# Patient Record
Sex: MALE | Race: BLACK OR AFRICAN AMERICAN | NOT HISPANIC OR LATINO | URBAN - METROPOLITAN AREA
[De-identification: names, ages, dates, MRNs, and addresses within clinical notes are randomized per-mention and may not be internally consistent; named-entity substitution may affect disease eponyms.]

---

## 2017-08-18 ENCOUNTER — EMERGENCY (EMERGENCY)
Facility: HOSPITAL | Age: 47
LOS: 0 days | Discharge: HOME | End: 2017-08-18
Attending: EMERGENCY MEDICINE | Admitting: EMERGENCY MEDICINE
Payer: COMMERCIAL

## 2017-08-18 VITALS — WEIGHT: 169.98 LBS | HEIGHT: 72 IN

## 2017-08-18 VITALS
RESPIRATION RATE: 18 BRPM | OXYGEN SATURATION: 97 % | DIASTOLIC BLOOD PRESSURE: 82 MMHG | TEMPERATURE: 98 F | SYSTOLIC BLOOD PRESSURE: 125 MMHG | HEART RATE: 69 BPM

## 2017-08-18 DIAGNOSIS — M54.9 DORSALGIA, UNSPECIFIED: ICD-10-CM

## 2017-08-18 DIAGNOSIS — S39.012A STRAIN OF MUSCLE, FASCIA AND TENDON OF LOWER BACK, INITIAL ENCOUNTER: ICD-10-CM
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
## 2017-08-18 DIAGNOSIS — Z91.010 ALLERGY TO PEANUTS: ICD-10-CM
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
## 2017-08-18 PROCEDURE — 99283 EMERGENCY DEPT VISIT LOW MDM: CPT

## 2017-08-18 PROCEDURE — 72070 X-RAY EXAM THORAC SPINE 2VWS: CPT | Mod: 26

## 2017-08-18 PROCEDURE — 72100 X-RAY EXAM L-S SPINE 2/3 VWS: CPT | Mod: 26

## 2017-08-18 RX ORDER — IBUPROFEN 200 MG
800 TABLET ORAL ONCE
Qty: 0 | Refills: 0 | Status: COMPLETED | OUTPATIENT
Start: 2017-08-18 | End: 2017-08-18

## 2017-08-18 RX ADMIN — Medication 800 MILLIGRAM(S): at 14:35

## 2017-08-18 NOTE — ED STATDOCS - MEDICAL DECISION MAKING DETAILS
XR of thoracic and lumbar spine WNL.  No emergent need for MRI from emergency department, can f/u as scheduled for this.  Recommend rest, and NSAIDs.  Return precautions given.
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
## 2017-08-18 NOTE — ED STATDOCS - PROGRESS NOTE DETAILS
STEPHEN Correa:   Patient has been seen, evaluated and orders have been written by the attending in intake. Patient is stable.  I will follow up the results of orders written and I will continue to evaluate/observe the patient.  Rosamaria Correa PA-C Pt. provided copies of readings of xrays.  Rosamaria Correa PA-C

## 2019-02-11 ENCOUNTER — TRANSCRIPTION ENCOUNTER (OUTPATIENT)
Age: 49
End: 2019-02-11

## 2022-02-15 ENCOUNTER — INPATIENT (INPATIENT)
Facility: HOSPITAL | Age: 52
LOS: 1 days | Discharge: HOME | DRG: 287 | End: 2022-02-17
Attending: INTERNAL MEDICINE | Admitting: INTERNAL MEDICINE
Payer: MEDICAID

## 2022-02-15 VITALS
DIASTOLIC BLOOD PRESSURE: 96 MMHG | TEMPERATURE: 97 F | RESPIRATION RATE: 18 BRPM | HEIGHT: 66 IN | OXYGEN SATURATION: 98 % | SYSTOLIC BLOOD PRESSURE: 159 MMHG | WEIGHT: 179.9 LBS | HEART RATE: 62 BPM

## 2022-02-15 DIAGNOSIS — R07.9 CHEST PAIN, UNSPECIFIED: ICD-10-CM

## 2022-02-15 LAB
ALBUMIN SERPL ELPH-MCNC: 4.8 G/DL — SIGNIFICANT CHANGE UP (ref 3.3–5)
ALP SERPL-CCNC: 64 U/L — SIGNIFICANT CHANGE UP (ref 40–120)
ALT FLD-CCNC: 19 U/L — SIGNIFICANT CHANGE UP (ref 10–45)
ANION GAP SERPL CALC-SCNC: 13 MMOL/L — SIGNIFICANT CHANGE UP (ref 5–17)
AST SERPL-CCNC: 20 U/L — SIGNIFICANT CHANGE UP (ref 10–40)
BASOPHILS # BLD AUTO: 0.03 K/UL — SIGNIFICANT CHANGE UP (ref 0–0.2)
BASOPHILS NFR BLD AUTO: 0.6 % — SIGNIFICANT CHANGE UP (ref 0–2)
BILIRUB SERPL-MCNC: 0.3 MG/DL — SIGNIFICANT CHANGE UP (ref 0.2–1.2)
BUN SERPL-MCNC: 22 MG/DL — SIGNIFICANT CHANGE UP (ref 7–23)
CALCIUM SERPL-MCNC: 9.6 MG/DL — SIGNIFICANT CHANGE UP (ref 8.4–10.5)
CHLORIDE SERPL-SCNC: 101 MMOL/L — SIGNIFICANT CHANGE UP (ref 96–108)
CO2 SERPL-SCNC: 24 MMOL/L — SIGNIFICANT CHANGE UP (ref 22–31)
CREAT SERPL-MCNC: 1.47 MG/DL — HIGH (ref 0.5–1.3)
EOSINOPHIL # BLD AUTO: 0.46 K/UL — SIGNIFICANT CHANGE UP (ref 0–0.5)
EOSINOPHIL NFR BLD AUTO: 9.5 % — HIGH (ref 0–6)
GLUCOSE SERPL-MCNC: 99 MG/DL — SIGNIFICANT CHANGE UP (ref 70–99)
HCT VFR BLD CALC: 44.5 % — SIGNIFICANT CHANGE UP (ref 39–50)
HGB BLD-MCNC: 14.3 G/DL — SIGNIFICANT CHANGE UP (ref 13–17)
IMM GRANULOCYTES NFR BLD AUTO: 0.2 % — SIGNIFICANT CHANGE UP (ref 0–1.5)
LYMPHOCYTES # BLD AUTO: 1.7 K/UL — SIGNIFICANT CHANGE UP (ref 1–3.3)
LYMPHOCYTES # BLD AUTO: 35.1 % — SIGNIFICANT CHANGE UP (ref 13–44)
MCHC RBC-ENTMCNC: 24.4 PG — LOW (ref 27–34)
MCHC RBC-ENTMCNC: 32.1 GM/DL — SIGNIFICANT CHANGE UP (ref 32–36)
MCV RBC AUTO: 75.8 FL — LOW (ref 80–100)
MONOCYTES # BLD AUTO: 0.28 K/UL — SIGNIFICANT CHANGE UP (ref 0–0.9)
MONOCYTES NFR BLD AUTO: 5.8 % — SIGNIFICANT CHANGE UP (ref 2–14)
NEUTROPHILS # BLD AUTO: 2.37 K/UL — SIGNIFICANT CHANGE UP (ref 1.8–7.4)
NEUTROPHILS NFR BLD AUTO: 48.8 % — SIGNIFICANT CHANGE UP (ref 43–77)
NRBC # BLD: 0 /100 WBCS — SIGNIFICANT CHANGE UP (ref 0–0)
PLATELET # BLD AUTO: 204 K/UL — SIGNIFICANT CHANGE UP (ref 150–400)
POTASSIUM SERPL-MCNC: 3.6 MMOL/L — SIGNIFICANT CHANGE UP (ref 3.5–5.3)
POTASSIUM SERPL-SCNC: 3.6 MMOL/L — SIGNIFICANT CHANGE UP (ref 3.5–5.3)
PROT SERPL-MCNC: 7.4 G/DL — SIGNIFICANT CHANGE UP (ref 6–8.3)
RBC # BLD: 5.87 M/UL — HIGH (ref 4.2–5.8)
RBC # FLD: 14.8 % — HIGH (ref 10.3–14.5)
SARS-COV-2 RNA SPEC QL NAA+PROBE: SIGNIFICANT CHANGE UP
SODIUM SERPL-SCNC: 138 MMOL/L — SIGNIFICANT CHANGE UP (ref 135–145)
TROPONIN T, HIGH SENSITIVITY RESULT: 8 NG/L — SIGNIFICANT CHANGE UP (ref 0–51)
TROPONIN T, HIGH SENSITIVITY RESULT: 9 NG/L — SIGNIFICANT CHANGE UP (ref 0–51)
WBC # BLD: 4.85 K/UL — SIGNIFICANT CHANGE UP (ref 3.8–10.5)
WBC # FLD AUTO: 4.85 K/UL — SIGNIFICANT CHANGE UP (ref 3.8–10.5)

## 2022-02-15 PROCEDURE — 70551 MRI BRAIN STEM W/O DYE: CPT | Mod: 26

## 2022-02-15 PROCEDURE — 74174 CTA ABD&PLVS W/CONTRAST: CPT | Mod: 26,MA

## 2022-02-15 PROCEDURE — 71275 CT ANGIOGRAPHY CHEST: CPT | Mod: 26,MA

## 2022-02-15 PROCEDURE — 71046 X-RAY EXAM CHEST 2 VIEWS: CPT | Mod: 26

## 2022-02-15 PROCEDURE — 99285 EMERGENCY DEPT VISIT HI MDM: CPT

## 2022-02-15 PROCEDURE — 93306 TTE W/DOPPLER COMPLETE: CPT | Mod: 26

## 2022-02-15 PROCEDURE — 70450 CT HEAD/BRAIN W/O DYE: CPT | Mod: 26,MA

## 2022-02-15 RX ORDER — ATORVASTATIN CALCIUM 80 MG/1
20 TABLET, FILM COATED ORAL AT BEDTIME
Refills: 0 | Status: DISCONTINUED | OUTPATIENT
Start: 2022-02-15 | End: 2022-02-15

## 2022-02-15 RX ORDER — LISINOPRIL 2.5 MG/1
20 TABLET ORAL DAILY
Refills: 0 | Status: DISCONTINUED | OUTPATIENT
Start: 2022-02-15 | End: 2022-02-17

## 2022-02-15 RX ORDER — ASPIRIN/CALCIUM CARB/MAGNESIUM 324 MG
81 TABLET ORAL DAILY
Refills: 0 | Status: DISCONTINUED | OUTPATIENT
Start: 2022-02-15 | End: 2022-02-17

## 2022-02-15 RX ORDER — SIMVASTATIN 20 MG/1
20 TABLET, FILM COATED ORAL AT BEDTIME
Refills: 0 | Status: DISCONTINUED | OUTPATIENT
Start: 2022-02-15 | End: 2022-02-17

## 2022-02-15 RX ORDER — ASPIRIN/CALCIUM CARB/MAGNESIUM 324 MG
162 TABLET ORAL ONCE
Refills: 0 | Status: COMPLETED | OUTPATIENT
Start: 2022-02-15 | End: 2022-02-15

## 2022-02-15 RX ADMIN — LISINOPRIL 20 MILLIGRAM(S): 2.5 TABLET ORAL at 14:02

## 2022-02-15 RX ADMIN — Medication 162 MILLIGRAM(S): at 08:54

## 2022-02-15 RX ADMIN — SIMVASTATIN 20 MILLIGRAM(S): 20 TABLET, FILM COATED ORAL at 22:07

## 2022-02-15 NOTE — ED PROVIDER NOTE - PHYSICAL EXAMINATION
gen: well appearing  Mentation: AAO x 3  psych: mood appropriate  ENT: airway patent  Eyes: conjunctivae clear bilaterally  Cardio: RRR, no m/r/g  Resp: normal BS b/l  GI: s/nt/nd  Neuro: sensation and motor function intact, CN 2-12 intact  MSK: normal movement of all extremities  Lymph/Vasc: no LE edema

## 2022-02-15 NOTE — H&P ADULT - NSHPPHYSICALEXAM_GEN_ALL_CORE
PHYSICAL EXAMINATION:  Vital Signs Last 24 Hrs  T(C): 36.7 (15 Feb 2022 06:39), Max: 36.7 (15 Feb 2022 06:39)  T(F): 98 (15 Feb 2022 06:39), Max: 98 (15 Feb 2022 06:39)  HR: 64 (15 Feb 2022 06:39) (62 - 64)  BP: 178/126 (15 Feb 2022 06:39) (159/96 - 178/126)  BP(mean): --  RR: 16 (15 Feb 2022 06:39) (16 - 18)  SpO2: 100% (15 Feb 2022 06:39) (98% - 100%)  CAPILLARY BLOOD GLUCOSE          GENERAL: NAD, well-groomed, well-developed  HEAD:  atraumatic, normocephalic  EYES: sclera anicteric  ENMT: mucous membranes moist  NECK: supple, No JVD  CHEST/LUNG: clear to auscultation bilaterally; no rales, rhonchi, or wheezing b/l  HEART: normal S1, S2  ABDOMEN: BS+, soft, ND, NT   EXTREMITIES:  pulses palpable; no clubbing, cyanosis, or edema b/l LEs  NEURO: awake, alert, interactive; moves all extremities  SKIN: no rashes or lesions PHYSICAL EXAMINATION:  Vital Signs Last 24 Hrs  T(C): 36.7 (15 Feb 2022 06:39), Max: 36.7 (15 Feb 2022 06:39)  T(F): 98 (15 Feb 2022 06:39), Max: 98 (15 Feb 2022 06:39)  HR: 64 (15 Feb 2022 06:39) (62 - 64)  BP: 178/126 (15 Feb 2022 06:39) (159/96 - 178/126)  BP(mean): --  RR: 16 (15 Feb 2022 06:39) (16 - 18)  SpO2: 100% (15 Feb 2022 06:39) (98% - 100%)  CAPILLARY BLOOD GLUCOSE          GENERAL: NAD, seen in ER, comfortable, no CP or SOB  HEAD:  atraumatic, normocephalic  EYES: sclera anicteric  ENMT: mucous membranes moist  NECK: supple, No JVD  CHEST/LUNG: clear to auscultation bilaterally; no rales, rhonchi, or wheezing b/l  HEART: normal S1, S2  ABDOMEN: BS+, soft, ND, NT   EXTREMITIES:  pulses palpable; no clubbing, cyanosis, or edema b/l LEs  NEURO: awake, alert, interactive; moves all extremities  SKIN: no rashes or lesions

## 2022-02-15 NOTE — H&P ADULT - ASSESSMENT
52 y/o male PMH of DM(II), presenting with chest pain eval. Patient states he has been having episodes of chest pain with associated syncope for 6 months, some SOB, nausea diaphoresis as well. States during syncope his body shuts down for few seconds at time. Episodes happen at random. Patient states he does not fall during these episodes. No urinary incontinence or noted shaking. Patient has not seen MD for this problem, dealing with this for months.  Does not have symptoms at this time, VSS in ER.     Plan: Admit to [facility]. TTE, stress test in AM.  Will continue home meds of ASA, Lisinopril and Zocur. Lipid panel and A1C in AM. No prior stress test. Labs and trop all negative.   No active infection.     CT head no acute findings. Brain MRI non-contrast as well.     If above normal discharge home in AM.

## 2022-02-15 NOTE — ED PROVIDER NOTE - PROGRESS NOTE DETAILS
Carlos, DO PGY-3: during evaluation patient started complaining of L arm weakness, inability to make a fist or lift arm. given CP now with ?neuro deficit (symptoms started after RN placed IV in arm) will obtain CT head as well as dissection study DO Carlos PGY-3: patient complaining of severe pain during contrast administration for CTA, IV flushing with ease, no evidence of extrav. patient without any symptoms concerning for anaphylaxis. pt states "my body is rejecting the liquid". discussed need for CTA to eval for dissection as patient hx with concerning symptoms. patient freely moving b/l upper and lower extremities, no focal deficit appreciated. pt agreed for contrast administration through a different site Mecca Chin MD (PGY2) -  Neurology consulted, will see patient shortly. Paged Dr. Norton for admission. Pt in agreement w plan. Currently patient states the LUE weakness has resolved. Rpt trop pending Mecca Chin MD (PGY2) -  Trop neg, CTA negative, pt endorsed to Dr. Norton at this time. TTE ordered.

## 2022-02-15 NOTE — ED ADULT NURSE NOTE - OBJECTIVE STATEMENT
51 y.o male c/o chest pain with associated syncope x1yr. Worsened tonight 51 y.o male c/o chest pain with associated syncope x1yr that worsened tonight. Pt states CP stopped after midnight. Endorsing intermittent swelling in B/L LE. Endorses diaphoresis and SOB associated with chest pain. Denies NVD, fever, cough, HA, change in vision. Pt speech clear. PMH pre diabetic and HTN. Pt takes Lipitor and Lisinopril

## 2022-02-15 NOTE — CONSULT NOTE ADULT - SUBJECTIVE AND OBJECTIVE BOX
CHIEF COMPLAINT:Patient is a 51y old  Male who presents with a chief complaint of Chest pain eval. (15 Feb 2022 10:40)      HPI:  52 y/o male PMH of DM(II), presenting with chest pain eval. Patient states he has been having episodes of chest pain with associated syncope for 6 months, some SOB, nausea diaphoresis as well. States during syncope his body shuts down for few seconds at time. Episodes happen at random. Patient states he does not fall during these episodes. No urinary incontinence or noted shaking. Patient has not seen MD for this problem, dealing with this for months.  Does not have symptoms at this time, VSS in ER.       PAST MEDICAL & SURGICAL HISTORY:      MEDICATIONS  (STANDING):  aspirin enteric coated 81 milliGRAM(s) Oral daily  lisinopril 20 milliGRAM(s) Oral daily  simvastatin 20 milliGRAM(s) Oral at bedtime    MEDICATIONS  (PRN):      FAMILY HISTORY:      SOCIAL HISTORY:    [ ] Non-smoker  [ ] Smoker  [ ] Alcohol    Allergies    No Known Allergies    Intolerances    	    REVIEW OF SYSTEMS:  CONSTITUTIONAL: No fever, weight loss, or fatigue  EYES: No eye pain, visual disturbances, or discharge  ENT:  No difficulty hearing, tinnitus, vertigo; No sinus or throat pain  NECK: No pain or stiffness  RESPIRATORY: No cough, wheezing, chills or hemoptysis; No Shortness of Breath  CARDIOVASCULAR: No chest pain, palpitations, passing out, dizziness, or leg swelling  GASTROINTESTINAL: No abdominal or epigastric pain. No nausea, vomiting, or hematemesis; No diarrhea or constipation. No melena or hematochezia.  GENITOURINARY: No dysuria, frequency, hematuria, or incontinence  NEUROLOGICAL: No headaches, memory loss, loss of strength, numbness, or tremors  SKIN: No itching, burning, rashes, or lesions   LYMPH Nodes: No enlarged glands  ENDOCRINE: No heat or cold intolerance; No hair loss  MUSCULOSKELETAL: No joint pain or swelling; No muscle, back, or extremity pain  PSYCHIATRIC: No depression, anxiety, mood swings, or difficulty sleeping  HEME/LYMPH: No easy bruising, or bleeding gums  ALLERGY AND IMMUNOLOGIC: No hives or eczema	    [ ] All others negative	  [ ] Unable to obtain    PHYSICAL EXAM:  T(C): 36.8 (02-15-22 @ 17:37), Max: 36.8 (02-15-22 @ 17:37)  HR: 56 (02-15-22 @ 17:37) (56 - 64)  BP: 135/87 (02-15-22 @ 17:37) (135/87 - 178/126)  RR: 18 (02-15-22 @ 17:37) (16 - 18)  SpO2: 99% (02-15-22 @ 12:17) (98% - 100%)  Wt(kg): --  I&O's Summary      Appearance: Normal	  HEENT:   Normal oral mucosa, PERRL, EOMI	  Lymphatic: No lymphadenopathy  Cardiovascular: Normal S1 S2, No JVD, + murmurs, No edema  Respiratory: Lungs clear to auscultation	  Psychiatry: A & O x 3, Mood & affect appropriate  Gastrointestinal:  Soft, Non-tender, + BS	  Skin: No rashes, No ecchymoses, No cyanosis	  Neurologic: Non-focal  Extremities: Normal range of motion, No clubbing, cyanosis or edema  Vascular: Peripheral pulses palpable 2+ bilaterally    TELEMETRY: 	    ECG:  	  RADIOLOGY:  OTHER: 	  	  LABS:	 	    CARDIAC MARKERS:                              14.3   4.85  )-----------( 204      ( 15 Feb 2022 06:59 )             44.5     02-15    138  |  101  |  22  ----------------------------<  99  3.6   |  24  |  1.47<H>    Ca    9.6      15 Feb 2022 06:59    TPro  7.4  /  Alb  4.8  /  TBili  0.3  /  DBili  x   /  AST  20  /  ALT  19  /  AlkPhos  64  02-15    proBNP:   Lipid Profile:   HgA1c:   TSH:       PREVIOUS DIAGNOSTIC TESTING:    < from: 12 Lead ECG (02.15.22 @ 07:00) >  Diagnosis Line *** POOR DATA QUALITY, INTERPRETATION MAY BE ADVERSELY AFFECTED  NORMAL SINUS RHYTHM  NONSPECIFIC T WAVE ABNORMALITY  NO PREVIOUS ECGS AVAILABLE    < from: Transthoracic Echocardiogram (02.15.22 @ 09:12) >  Mitral Valve: Normal mitral valve. Minimal mitral  regurgitation.  Aortic Valve/Aorta: Normal aortic valve.  Normal aortic root size.  Left Atrium: Normal left atrium.  Left Ventricle: Normal left ventricular internal dimensions  and wall thicknesses.  Normal left ventricular systolic function. No segmental  wall motion abnormalities.  Normal diastolic function.  Right Heart: Normal right atrium. Normal right ventricular  size and function.  Normal tricuspid valve. Mild tricuspid regurgitation.  Normal pulmonic valve. Minimal pulmonic regurgitation.  Pericardium/Pleura: Normal pericardium with no pericardial  effusion.  Hemodynamic: Estimated right atrial pressure is normal.  No evidence of pulmonary hypertension.  No PFO seen with color Doppler.  ------------------------------------------------------------------------  Conclusions:  Normal left ventricular systolic function. No segmental  wall motion abnormalities.  Normal right ventricular size and function.    < from: Xray Chest 2 Views PA/Lat (02.15.22 @ 07:43) >  INTERPRETATION: The heart is not enlarged.  The trachea is midline.  The mediastinum and tay appear unremarkable.  The lungs are clear.  No pleural effusion or pneumothorax is seen.  No acute bony abnormality is noted.    IMPRESSION:  Clear lungs.    Please see the report of the CTA of the chest for further detail.

## 2022-02-15 NOTE — CONSULT NOTE ADULT - ASSESSMENT
INCOMPLETE         Sleep Disordered Breathing Screen    SDB Screen - Positive/Negative  Witnessed Apneas: Yes/No  Snoring: Yes/No  Early AM headaches: Yes/No  Waking with a dry mouth/throat: Yes/No  Choking or gasping awakenings: Yes/No 50 yo RH male w/ PMx DM, HLD, HTN p/w chest pain. Neurology consulted for intermittent ~1 second episodes of +LOC w/o loss of motor control, w/o seizure-like activity, occurring up to zero to four times per day, since the last 1-2 years. Also c/o numbness/tingling throughout R neck, RUE, R torso, RLE ongoing for last 6-7 months, occurs while in bed, resolves w/ sitting upright in bed. Also c/o intermittent cramping-type symptoms of LE (RLE > LLE). Neurological examination: impaired short-term memory (retrieval failure); impaired attention/concentration; grossly 5/5 throughout; DTRs 2+; intact light touch, pinprick, and proprioception throughout. CTH: negative.     Impression: ***    Plan: ***  [] ***      Sleep Disordered Breathing Screen    SDB Screen - Positive/Negative  Witnessed Apneas: Yes/No  Snoring: Yes/No  Early AM headaches: Yes/No  Waking with a dry mouth/throat: Yes/No  Choking or gasping awakenings: Yes/No 52 yo RH male w/ PMx DM, HLD, HTN p/w chest pain. Neurology consulted for intermittent ~1 second episodes of +LOC w/o loss of motor control, w/o seizure-like activity, occurring up to zero to four times per day, since the last 1-2 years. Also c/o numbness/tingling throughout R neck, RUE, R torso, RLE ongoing for last 6-7 months, occurs while in bed, resolves w/ sitting upright in bed. Also c/o intermittent cramping-type symptoms of LE (RLE > LLE). Neurological examination: impaired short-term memory (retrieval failure); impaired attention/concentration; grossly 5/5 throughout; DTRs 2+; intact light touch, pinprick, and proprioception throughout. CTH: negative. Negative sleep disordered breathing screen.    Impression: ***    Plan: ***  [] vEEG  [] ***incomplete***     50 yo RH male w/ PMx DM, HLD, HTN p/w chest pain. Neurology consulted for intermittent ~1 second episodes of +LOC w/o loss of motor control, w/o seizure-like activity, occurring up to zero to four times per day, since the last 1-2 years; also reports feeling fatigue most days. Also c/o numbness/tingling throughout R neck, RUE, R torso, RLE ongoing for last 6-7 months, occurs while in bed, resolves w/ sitting upright in bed. Also c/o intermittent cramping-type symptoms of LE (RLE > LLE). Neurological examination: impaired short-term memory (retrieval failure); impaired attention/concentration; grossly 5/5 throughout; DTRs 2+; intact light touch, pinprick, and proprioception throughout. CTH: negative. Negative sleep disordered breathing screen.    Impression: (1) reportedly intermittent 1 second episodes of +LOC w/o loss of motor control, w/o seizure-like symptoms a/w feeling fatigued; unclear etiology, less consistent w/ seizure etiology; would consider underlying sleep disorder. (2) Intermittent right-sided numbness occurring while supine, falling asleep; unclear neurological localization given that it affects torso as well; given vascular risk factors, would consider hypoperfusion of contralateral ?subcortical brain (unlikely). (3) Lower extremity cramp-like pain most likely musculoskeletal +/- metabolic etiology rather than neurological    Plan:   [] vEEG  [] Consider outpatient sleep study  [] Consider MRI brain w/o contrast (will discuss with Neurology Attending in the AM)  [] B1, B6, B12, TSH, FT4, A1c     50 yo RH male w/ PMx DM, HLD, HTN p/w chest pain. Neurology consulted for intermittent ~1 second episodes of +LOC w/o loss of motor control, w/o seizure-like activity, occurring up to zero to four times per day, since the last 1-2 years; also reports feeling fatigue most days. Also c/o numbness/tingling throughout R neck, RUE, R torso, RLE ongoing for last 6-7 months, occurs while in bed, resolves w/ sitting upright in bed. Also c/o intermittent cramping-type symptoms of LE (RLE > LLE). Neurological examination: impaired short-term memory (retrieval failure); impaired attention/concentration; grossly 5/5 throughout; DTRs 2+; intact light touch, pinprick, and proprioception throughout. CTH: negative. Negative sleep disordered breathing screen.    Impression: (1) reportedly intermittent 1 second episodes of +LOC w/o loss of motor control, w/o seizure-like symptoms a/w feeling fatigued; unclear etiology, less consistent w/ seizure etiology; would consider underlying sleep disorder. (2) Intermittent right-sided numbness occurring while supine, falling asleep; unclear neurological localization given that it affects torso as well; given vascular risk factors, would consider hypoperfusion of contralateral ?subcortical brain (unlikely). (3) Lower extremity cramp-like pain most likely musculoskeletal +/- metabolic etiology rather than neurological    Plan:   [] Routine EEG  [] Consider outpatient sleep study  [] Consider MRI brain w/o contrast (will discuss with Neurology Attending in the AM)  [] B1, B6, B12, TSH, FT4, A1c, vitamin D, RPR, HIV, ESR, CRP, CPK, serum copper

## 2022-02-15 NOTE — CONSULT NOTE ADULT - ATTENDING COMMENTS
HPI as per resident note, personally verified by me. Symptoms are resolved and he feels much better    SHx: (-) tobacco, alcohol, illicit drug use  Allergies: NKDA    Strength 5/5 all, no reproduction of symptoms with arm raised  Tinel's (-) at R elbow  DTR's - 2+ all and neutral b/l plantar response  Romberg (-)    A/P:  LOC/Other amnesia  Skin sensation disturbance  Muscle spasm  Cervicalgia  HTN  DM type 2    - Events of brief LOC without loss of tone and other transient symptoms have strong functional overlay but could have underlying disorder such as seizures or toxic/metabolic effect. MRI brain unrevealing for cause. He does endorse neck pain so need to assess for cervical myelopathy or radiculopathy. No reproduction of symptoms with RUE raised so would doubt thoracic outlet syndrome  - rEEG to evaluate for focal slowing, epileptiform discharges, or seizures; can be done as outpatient but if done as inpatient and results significant please call us back  - MRI c-spine w/o as outpatient  - Agree with resident labs but would also check for Vit D, RPR, HIV, ESR, CRP, CPK, copper (RBC)  - Continue to address above medical problems, as you are doing  - Will continue to follow peripherally with you, please call with additional questions or concerns

## 2022-02-15 NOTE — ED PROVIDER NOTE - ATTENDING CONTRIBUTION TO CARE
I performed a history and physical exam of the patient and discussed their management with the resident and /or advanced care provider. I reviewed the resident and /or ACP's note and agree with the documented findings and plan of care except where otherwise noted. My medical decision making and observations are found below     52 yo M with PMH of HTN presenting with chest pain. Patient states he has been having episodes of chest pain with associated syncope, SOB, nausea diaphoresis. Chest pain is dull, L side, radiating to R arm. Not exertional, not pleuritic. States during syncope his body "shuts down for few seconds at time". Episodes happen at random. Patient states he does not fall during these episodes. No urinary incontinence or noted shaking or tongue biting.. Patient has not seen MD for this problem, dealing with for months.     During evaluation, patient suddenly could not move or make a fist with L hand. Given chest pain and neuro symptoms with htn, patient taken urgently for dissection study.     PHYSICAL EXAM:   General: well-appearing, appears stated age, not in extremis   HEENT: NC/AT, airway patent  Cardiovascular: regular rate and rhythm, + S1/S2, no murmurs, rubs, gallops appreciated  Respiratory: nonlabored respirations  Extremities: no LE edema or calf tenderness b/l. Radial pulses equal and strong b/l  Neuro: awake, alert, interactive. Strength 5/5 and sensation in tact to light touch in b/l LE. Sensation in tact to b/l UE to light touch. Patient unable to make a fist with L hand.   -Rocío Landaverde MD Attending Physician     EKG NSR with normal intervals, no overt ischemic changes    MDM: hx and physical as noted above.  CTA to eval for dissection as noted above   ekg/trop/cardiac monitor to eval for acs vs arrhythmia   CT head to eval for intracranial etiology of syncope and neuro deficit.   If workup unremarkable, patient should be admitted for echo given no prior cardiac workup to eval for structural heart disease I performed a history and physical exam of the patient and discussed their management with the resident and /or advanced care provider. I reviewed the resident and /or ACP's note and agree with the documented findings and plan of care except where otherwise noted. My medical decision making and observations are found below     50 yo M with PMH of HTN presenting with chest pain. Patient states he has been having episodes of chest pain with associated syncope, SOB, nausea diaphoresis. Chest pain is dull, L side, radiating to R arm. Not exertional, not pleuritic. States during syncope his body "shuts down for few seconds at time". Episodes happen at random. Patient states he does not fall during these episodes. No urinary incontinence or noted shaking or tongue biting.. Patient has not seen MD for this problem, dealing with for months.     During evaluation, patient suddenly could not move or make a fist with L hand. Given chest pain and neuro symptoms with htn, patient taken urgently for dissection study.     PHYSICAL EXAM:   General: well-appearing, appears stated age, not in extremis   HEENT: NC/AT, airway patent  Cardiovascular: regular rate and rhythm, + S1/S2, no murmurs, rubs, gallops appreciated  Respiratory: nonlabored respirations  Extremities: no LE edema or calf tenderness b/l. Radial pulses equal and strong b/l  Neuro: awake, alert, interactive. Strength 5/5 and sensation in tact to light touch in b/l LE. Sensation in tact to b/l UE to light touch. Patient unable to make a fist with L hand.   -Rocío Landaverde MD Attending Physician     EKG NSR with normal intervals, no overt ischemic changes    MDM: hx and physical as noted above.  CTA to eval for dissection as noted above   ekg/trop/cardiac monitor to eval for acs vs arrhythmia   CT head to eval for intracranial etiology of syncope and neuro deficit.   If workup unremarkable, patient should be admitted for echo given no prior cardiac workup to eval for structural heart disease  code stroke not called at this time because symptoms resolving

## 2022-02-15 NOTE — ED PROVIDER NOTE - CLINICAL SUMMARY MEDICAL DECISION MAKING FREE TEXT BOX
DO Carlos PGY-3: 52 y/o M presenting with episodes of chest pain with associated diaphoresis, nausea, syncopal episodes concerning for cardiac etiology. Patient initially neuro intact however during encounter pt having pain with weakness of L arm. Will obtain dissection study. Labs, trop, EKG. Likely TBA for syncope/chest pain w/u

## 2022-02-15 NOTE — CONSULT NOTE ADULT - ASSESSMENT
50 y/o male PMH of DM(II), presenting with chest pain eval. Patient states he has been having episodes of chest pain with associated syncope for 6 months, some SOB, nausea diaphoresis as well. States during syncope his body shuts down for few seconds at time. Episodes happen at random. Patient states he does not fall during these episodes. No urinary incontinence or noted shaking. Patient has not seen MD for this problem, dealing with this for months.  Does not have symptoms at this time, VSS in ER.   pt with chest pain ?atypical  awaiting stress test asa daily  echo no WMA  lipid panel  asa daily  d dimer  check orthostatic  tele

## 2022-02-15 NOTE — CONSULT NOTE ADULT - SUBJECTIVE AND OBJECTIVE BOX
Neurology - Consult Note - 02-15-22  -  Jerad Gilman MD  PGY-2 Neurology  Spectra: [phone] ([facility]), [phone] ([facility])  -    Name: ABDIFATAH WHELAN; 51y (1970)    Reason for Admission: Chest pain    Chief Complaint:     HPI:      Review of Systems:  INCOMPLETE   CONSTITUTIONAL: No fevers or chills  EYES/ENT: No visual changes or no throat pain   NECK: No pain or stiffness  RESPIRATORY: No hemoptysis or shortness of breath  CARDIOVASCULAR: No chest pain or palpitations  GASTROINTESTINAL: No melena or hematochezia.  GENITOURINARY: No dysuria or hematuria  NEUROLOGICAL: +As stated in HPI above  SKIN: No itching, burning, rashes, or lesions   All other review of systems is negative unless indicated above.    Allergies:      PMHx:     PFHx:     PSuHx:       Medications:  MEDICATIONS  (STANDING):    MEDICATIONS  (PRN):      Vitals:  T(C): 36.7 (02-15-22 @ 06:39), Max: 36.7 (02-15-22 @ 06:39)  HR: 64 (02-15-22 @ 06:39) (62 - 64)  BP: 178/126 (02-15-22 @ 06:39) (159/96 - 178/126)  RR: 16 (02-15-22 @ 06:39) (16 - 18)  SpO2: 100% (02-15-22 @ 06:39) (98% - 100%)    Physical Examination: INCOMPLETE  ***    Labs:                        14.3   4.85  )-----------( 204      ( 15 Feb 2022 06:59 )             44.5     02-15    138  |  101  |  22  ----------------------------<  99  3.6   |  24  |  1.47<H>    Ca    9.6      15 Feb 2022 06:59    TPro  7.4  /  Alb  4.8  /  TBili  0.3  /  DBili  x   /  AST  20  /  ALT  19  /  AlkPhos  64  02-15    CAPILLARY BLOOD GLUCOSE        LIVER FUNCTIONS - ( 15 Feb 2022 06:59 )  Alb: 4.8 g/dL / Pro: 7.4 g/dL / ALK PHOS: 64 U/L / ALT: 19 U/L / AST: 20 U/L / GGT: x            Radiology:  CT Head No Cont:  (15 Feb 2022 07:17)    FINDINGS:  VENTRICLES AND SULCI:  Normal.  INTRA-AXIAL:  No mass, blood or abnormal attenuation is seen.  EXTRA-AXIAL:  No mass or collection is seen.  VISUALIZED SINUSES:  Mild maxillary mucosal thickening  VISUALIZED MASTOIDS:  Clear.  CALVARIUM:  Normal.  MISCELLANEOUS:  None.    IMPRESSION:  Normal non-contrast CT of the brain. Neurology - Consult Note - 02-15-22  -  Jerad Gilman MD  PGY-2 Neurology  Spectra: [phone] ([facility]), [phone] ([facility])  -    Name: ABDIFATAH WHELAN; 51y (1970)    Reason for Admission: Chest pain    Chief Complaint:     HPI: INCOMPLETE  51 year old right-handed male w/ PMHx DM, HLD, HTN who presents to [facility] ED for evaluation of chest pain. Neurology consulted for reported history of intermittent LOC episodes lasting less than 1 second. Per patient, he has been having these episodes for at least the last 1-2 years. He describes it as his brain briefly shutting down. He can go 2-3 days without these episodes, while at other times, they may recur up to 4 times in one day. It can occur at any time while he is awake, including while standing up, sitting down, driving, washing dishes, and so on. For instance, patient describes an episode of washing dishes where his eyes close, he loses consciousness, and wakes up immediately without confusion. He maintains motor control (e.g., did not drop dishes), does not feel faint, does not lose bowel or bladder control, does not shake, and generally does not have any other symptoms.     He also reports intermittent episodes of numbness/tingling that courses throughout his entire right side, including right side of neck, right upper extremity, right torso, and right lower extremity. These episodes occur primarily at night while resting in bed prior/during sleep. Patient states the only way to resolve the symptom is for him to sit upright, which results in gradual resolution. This has been ongoing for probably the last 6-7 months.      He also reports intermittent cramping-type symptoms associated with involuntary movement (not shaking, not tremors) of lower extremities (he is unsure, but believes it is occurs ***    He denies history of smoking, alcohol, or drug use. He denies any surgical history. He only takes medications for DM/HTN and HLD. He reports a history of migraines triggered solely by chocolate consumption. For instance, his last migraine headache lasted for ~3 days, and was associated with chocolate consumption. He does not have these migraines often, as he avoids chocolate.      Review of Systems:  INCOMPLETE   CONSTITUTIONAL: No fevers or chills  EYES/ENT: No visual changes or no throat pain   NECK: No pain or stiffness  RESPIRATORY: No hemoptysis or shortness of breath  CARDIOVASCULAR: No chest pain or palpitations  GASTROINTESTINAL: No melena or hematochezia.  GENITOURINARY: No dysuria or hematuria  NEUROLOGICAL: +As stated in HPI above  SKIN: No itching, burning, rashes, or lesions   All other review of systems is negative unless indicated above.    Allergies:      PMHx:     PFHx:     PSuHx:       Medications:  MEDICATIONS  (STANDING):    MEDICATIONS  (PRN):      Vitals:  T(C): 36.7 (02-15-22 @ 06:39), Max: 36.7 (02-15-22 @ 06:39)  HR: 64 (02-15-22 @ 06:39) (62 - 64)  BP: 178/126 (02-15-22 @ 06:39) (159/96 - 178/126)  RR: 16 (02-15-22 @ 06:39) (16 - 18)  SpO2: 100% (02-15-22 @ 06:39) (98% - 100%)    Physical Examination: INCOMPLETE  ***    Labs:                        14.3   4.85  )-----------( 204      ( 15 Feb 2022 06:59 )             44.5     02-15    138  |  101  |  22  ----------------------------<  99  3.6   |  24  |  1.47<H>    Ca    9.6      15 Feb 2022 06:59    TPro  7.4  /  Alb  4.8  /  TBili  0.3  /  DBili  x   /  AST  20  /  ALT  19  /  AlkPhos  64  02-15    CAPILLARY BLOOD GLUCOSE        LIVER FUNCTIONS - ( 15 Feb 2022 06:59 )  Alb: 4.8 g/dL / Pro: 7.4 g/dL / ALK PHOS: 64 U/L / ALT: 19 U/L / AST: 20 U/L / GGT: x            Radiology:  CT Head No Cont:  (15 Feb 2022 07:17)    FINDINGS:  VENTRICLES AND SULCI:  Normal.  INTRA-AXIAL:  No mass, blood or abnormal attenuation is seen.  EXTRA-AXIAL:  No mass or collection is seen.  VISUALIZED SINUSES:  Mild maxillary mucosal thickening  VISUALIZED MASTOIDS:  Clear.  CALVARIUM:  Normal.  MISCELLANEOUS:  None.    IMPRESSION:  Normal non-contrast CT of the brain. Neurology - Consult Note - 02-15-22  -  Jerad Gilman MD  PGY-2 Neurology  Spectra: [phone] ([facility]), [phone] ([facility])  -    Name: ABDIFATAH WHELAN; 51y (1970)    Reason for Admission: Chest pain    Chief Complaint:     HPI: INCOMPLETE  51 year old right-handed male w/ PMHx DM, HLD, HTN who presents to [facility] ED for evaluation of chest pain. Neurology consulted for reported history of intermittent LOC episodes lasting less than 1 second. Per patient, he has been having these episodes for at least the last 1-2 years. He describes it as his brain briefly shutting down. He can go 2-3 days without these episodes, while at other times, they may recur up to 4 times in one day. It can occur at any time while he is awake, including while standing up, sitting down, driving, washing dishes, and so on. For instance, patient describes an episode of washing dishes where his eyes close, he loses consciousness, and wakes up immediately without confusion. He maintains motor control (e.g., did not drop dishes), does not feel faint, does not lose bowel or bladder control, does not shake, and generally does not have any other symptoms.     He also reports intermittent episodes of numbness/tingling that courses throughout his entire right side, including right side of neck, right upper extremity, right torso, and right lower extremity. These episodes occur primarily at night while resting in bed prior/during sleep. Patient states the only way to resolve the symptom is for him to sit upright, which results in gradual resolution. This has been ongoing for probably the last 6-7 months.      He also reports intermittent cramping-type symptoms associated with involuntary movement (not shaking, not tremors) of lower extremities (he is unsure, but believes it is occurs in one extremity at a time, possibly both left and right, most often on the right). Involuntary movement is described as starting with pulsating in dorsal aspect of right food, followed by crossing of his toes, then plantarflexion of foot, which "locks up" and remains stiffened. This stiffening can also occur in his calf and thigh as well. Patient resolves symptoms by hitting the affected muscles with his hand. These episodes occur about once a week, but have been occurring for longer durations recently.    Patient also c/o new onset intermittent numbness to 4th and 5th digit of right upper extremity, noticed within the last 24 hours prior to this encounter.    He denies history of smoking, alcohol, or drug use. Family history only significant for diabetes in mother and father. He denies any surgical history. He only takes medications for DM/HTN and HLD. He reports a history of migraines triggered solely by chocolate consumption. For instance, his last migraine headache lasted for ~3 days, and was associated with chocolate consumption. He does not have these migraines often, as he avoids chocolate.      Review of Systems:    CONSTITUTIONAL: No fevers or chills  EYES/ENT: No visual changes or no throat pain   NECK: No pain or stiffness  RESPIRATORY: No hemoptysis or shortness of breath  CARDIOVASCULAR: +Chest pain  GASTROINTESTINAL: No melena or hematochezia.  GENITOURINARY: No dysuria or hematuria  NEUROLOGICAL: +As stated in HPI above  SKIN: No itching, burning, rashes, or lesions   All other review of systems is negative unless indicated above.    Allergies:      PMHx:   HTN  DM  HLD    PFHx:   Diabetes in mother and father    PSuHx:   None    Medications:  MEDICATIONS  (STANDING):    MEDICATIONS  (PRN):      Vitals:  T(C): 36.7 (02-15-22 @ 06:39), Max: 36.7 (02-15-22 @ 06:39)  HR: 64 (02-15-22 @ 06:39) (62 - 64)  BP: 178/126 (02-15-22 @ 06:39) (159/96 - 178/126)  RR: 16 (02-15-22 @ 06:39) (16 - 18)  SpO2: 100% (02-15-22 @ 06:39) (98% - 100%)      Physical Examination:      Constitutional: well-developed, well-nourished    Eyes: ophthalmoscopic exam deferred secondary to COVID-19 pandemic  Cardiovascular: no swelling, warm-to-touch    Neurological Examination:    - Mental Status: Alert, awake, oriented to person, place, and time; speech is fluent with intact naming, repetition, and follows 1-step and 3-step mid-line crossing commands; good overall fund of knowledge (aware of current events, relevant past history, and vocabulary appropriate for level of education); immediate recall is 3/3 words and delayed recall is 2/3 words at 5 minutes; able to spell WORLD backwards, unable to count backwards from 20; unable to perform serial 7 subtraction.    - Cranial Nerves:  II: Visual fields are full to confrontation; pupils are equal, round, and reactive to light   III, IV, VI: Extraocular movements are intact without nystagmus  V: Facial sensation is intact in the V1-V3 distribution bilaterally  VII: Face is symmetric with normal eye closure and smile  VIII: Hearing is intact to finger rub  IX, X: Uvula is midline and soft palate rises symmetrically  XI: Shoulder shrug intact  XII: Tongue protrudes in the midline    - Motor/Strength Testing:   -- RUE biceps exam limited at elbow d/t pain from IV  -- LUE wrist exam limited d/t IV in hand                                   Right           Left  Deltoid                     5                 5  Biceps                      4+                 5  Triceps                     5                5  Wrist Ext (radial)       5                 LUE wrist exam limited d/t IV in hand  Hand                  5                 5  Interphalangeal         5                 5    Hip Flex                   5                  5  Knee Ext	      5                  5  Dorsiflex                  5                  5  Plantarflex               5                  5    - There is no pronator drift.   - Normal muscle bulk and tone throughout.    - Reflexes:   Bicep (C5/C6):                  R 2+ --- L 2+   Brachioradialis (C5/C6) :   R 2+ --- L 2+   Patella (L3/L4) :                 R 2+ --- L 2+   Ankle (S1) :                       R 2+ --- L 2+     - Plant responses down bilaterally.    - Sensory: Intact throughout to light touch and pinprick throughout (including distal upper extremities, including along b/l ulnar distributions).   - Coordination: Finger-nose-finger intact without ataxia or dysmetria.    - Gait: Normal steps, base, arm swing, and turning.     Labs:                        14.3   4.85  )-----------( 204      ( 15 Feb 2022 06:59 )             44.5     02-15    138  |  101  |  22  ----------------------------<  99  3.6   |  24  |  1.47<H>    Ca    9.6      15 Feb 2022 06:59    TPro  7.4  /  Alb  4.8  /  TBili  0.3  /  DBili  x   /  AST  20  /  ALT  19  /  AlkPhos  64  02-15    CAPILLARY BLOOD GLUCOSE        LIVER FUNCTIONS - ( 15 Feb 2022 06:59 )  Alb: 4.8 g/dL / Pro: 7.4 g/dL / ALK PHOS: 64 U/L / ALT: 19 U/L / AST: 20 U/L / GGT: x            Radiology:  CT Head No Cont:  (15 Feb 2022 07:17)    FINDINGS:  VENTRICLES AND SULCI:  Normal.  INTRA-AXIAL:  No mass, blood or abnormal attenuation is seen.  EXTRA-AXIAL:  No mass or collection is seen.  VISUALIZED SINUSES:  Mild maxillary mucosal thickening  VISUALIZED MASTOIDS:  Clear.  CALVARIUM:  Normal.  MISCELLANEOUS:  None.    IMPRESSION:  Normal non-contrast CT of the brain. Neurology - Consult Note - 02-15-22  -  Jerad Gilman MD  PGY-2 Neurology  Spectra: [phone] ([facility]), [phone] ([facility])  -    Name: ABDIFATAH WHELAN; 51y (1970)    Reason for Admission: Chest pain    Chief Complaint:     HPI:    51 year old right-handed male w/ PMHx DM, HLD, HTN who presents to [facility] ED for evaluation of chest pain. Neurology consulted for reported history of intermittent LOC episodes lasting less than 1 second. Per patient, he has been having these episodes for at least the last 1-2 years. He describes it as his brain briefly shutting down. He can go 2-3 days without these episodes, while at other times, they may recur up to 4 times in one day. It can occur at any time while he is awake, including while standing up, sitting down, driving, washing dishes, and so on. For instance, patient describes an episode of washing dishes where his eyes close, he loses consciousness, and wakes up immediately without confusion. He maintains motor control (e.g., did not drop dishes), does not feel faint, does not lose bowel or bladder control, does not shake, and generally does not have any other symptoms.     He also reports intermittent episodes of numbness/tingling that courses throughout his entire right side, including right side of neck, right upper extremity, right torso, and right lower extremity. These episodes occur primarily at night while resting in bed prior/during sleep. Patient states the only way to resolve the symptom is for him to sit upright, which results in gradual resolution. This has been ongoing for probably the last 6-7 months.      He also reports intermittent cramping-type symptoms associated with involuntary movement (not shaking, not tremors) of lower extremities (he is unsure, but believes it is occurs in one extremity at a time, possibly both left and right, most often on the right). Involuntary movement is described as starting with pulsating in dorsal aspect of right food, followed by crossing of his toes, then plantarflexion of foot, which "locks up" and remains stiffened. This stiffening can also occur in his calf and thigh as well. Patient resolves symptoms by hitting the affected muscles with his hand. These episodes occur about once a week, but have been occurring for longer durations recently.    Patient also c/o new onset intermittent numbness to 4th and 5th digit of right upper extremity, noticed within the last 24 hours prior to this encounter.    He denies history of smoking, alcohol, or drug use. Family history only significant for diabetes in mother and father. He denies any surgical history. He only takes medications for DM/HTN and HLD. He reports a history of migraines triggered solely by chocolate consumption. For instance, his last migraine headache lasted for ~3 days, and was associated with chocolate consumption. He does not have these migraines often, as he avoids chocolate.      Sleep Disordered Breathing Screen:    SDB Screen - Negative  Witnessed Apneas: No  Snoring: No  Early AM headaches: No  Waking with a dry mouth/throat: No  Choking or gasping awakenings: No    ------    Review of Systems:    CONSTITUTIONAL: No fevers or chills  EYES/ENT: No visual changes or no throat pain   NECK: No pain or stiffness  RESPIRATORY: No hemoptysis or shortness of breath  CARDIOVASCULAR: +Chest pain  GASTROINTESTINAL: No melena or hematochezia.  GENITOURINARY: No dysuria or hematuria  NEUROLOGICAL: +As stated in HPI above  SKIN: No itching, burning, rashes, or lesions   All other review of systems is negative unless indicated above.    Allergies:      PMHx:   HTN  DM  HLD    PFHx:   Diabetes in mother and father    PSuHx:   None    Medications:  MEDICATIONS  (STANDING):    MEDICATIONS  (PRN):      Vitals:  T(C): 36.7 (02-15-22 @ 06:39), Max: 36.7 (02-15-22 @ 06:39)  HR: 64 (02-15-22 @ 06:39) (62 - 64)  BP: 178/126 (02-15-22 @ 06:39) (159/96 - 178/126)  RR: 16 (02-15-22 @ 06:39) (16 - 18)  SpO2: 100% (02-15-22 @ 06:39) (98% - 100%)      Physical Examination:      Constitutional: well-developed, well-nourished    Eyes: ophthalmoscopic exam deferred secondary to COVID-19 pandemic  Cardiovascular: no swelling, warm-to-touch    Neurological Examination:    - Mental Status: Alert, awake, oriented to person, place, and time; speech is fluent with intact naming, repetition, and follows 1-step and 3-step mid-line crossing commands; good overall fund of knowledge (aware of current events, relevant past history, and vocabulary appropriate for level of education); immediate recall is 3/3 words and delayed recall is 2/3 words at 5 minutes; able to spell WORLD backwards, unable to count backwards from 20; unable to perform serial 7 subtraction.    - Cranial Nerves:  II: Visual fields are full to confrontation; pupils are equal, round, and reactive to light   III, IV, VI: Extraocular movements are intact without nystagmus  V: Facial sensation is intact in the V1-V3 distribution bilaterally  VII: Face is symmetric with normal eye closure and smile  VIII: Hearing is intact to finger rub  IX, X: Uvula is midline and soft palate rises symmetrically  XI: Shoulder shrug intact  XII: Tongue protrudes in the midline    - Motor/Strength Testing:   -- RUE biceps exam limited at elbow d/t pain from IV  -- LUE wrist exam limited d/t IV in hand                                   Right           Left  Deltoid                     5                 5  Biceps                      4+                 5  Triceps                     5                5  Wrist Ext (radial)       5                 LUE wrist exam limited d/t IV in hand  Hand                  5                 5  Interphalangeal         5                 5    Hip Flex                   5                  5  Knee Ext	      5                  5  Dorsiflex                  5                  5  Plantarflex               5                  5    - There is no pronator drift.   - Normal muscle bulk and tone throughout.    - Reflexes:   Bicep (C5/C6):                  R 2+ --- L 2+   Brachioradialis (C5/C6) :   R 2+ --- L 2+   Patella (L3/L4) :                 R 2+ --- L 2+   Ankle (S1) :                       R 2+ --- L 2+     - Plant responses down bilaterally.    - Sensory: Intact throughout to light touch and pinprick throughout (including distal upper extremities, including along b/l ulnar distributions).   - Coordination: Finger-nose-finger intact without ataxia or dysmetria.    - Gait: Normal steps, base, arm swing, and turning.     Labs:                        14.3   4.85  )-----------( 204      ( 15 Feb 2022 06:59 )             44.5     02-15    138  |  101  |  22  ----------------------------<  99  3.6   |  24  |  1.47<H>    Ca    9.6      15 Feb 2022 06:59    TPro  7.4  /  Alb  4.8  /  TBili  0.3  /  DBili  x   /  AST  20  /  ALT  19  /  AlkPhos  64  02-15    CAPILLARY BLOOD GLUCOSE        LIVER FUNCTIONS - ( 15 Feb 2022 06:59 )  Alb: 4.8 g/dL / Pro: 7.4 g/dL / ALK PHOS: 64 U/L / ALT: 19 U/L / AST: 20 U/L / GGT: x            Radiology:  CT Head No Cont:  (15 Feb 2022 07:17)    FINDINGS:  VENTRICLES AND SULCI:  Normal.  INTRA-AXIAL:  No mass, blood or abnormal attenuation is seen.  EXTRA-AXIAL:  No mass or collection is seen.  VISUALIZED SINUSES:  Mild maxillary mucosal thickening  VISUALIZED MASTOIDS:  Clear.  CALVARIUM:  Normal.  MISCELLANEOUS:  None.    IMPRESSION:  Normal non-contrast CT of the brain.

## 2022-02-15 NOTE — ED ADULT NURSE NOTE - NSIMPLEMENTINTERV_GEN_ALL_ED
Implemented All Fall Risk Interventions:  [place] to call system. Call bell, personal items and telephone within reach. Instruct patient to call for assistance. Room bathroom lighting operational. Non-slip footwear when patient is off stretcher. Physically safe environment: no spills, clutter or unnecessary equipment. Stretcher in lowest position, wheels locked, appropriate side rails in place. Provide visual cue, wrist band, yellow gown, etc. Monitor gait and stability. Monitor for mental status changes and reorient to person, place, and time. Review medications for side effects contributing to fall risk. Reinforce activity limits and safety measures with patient and family.

## 2022-02-15 NOTE — ED PROVIDER NOTE - OBJECTIVE STATEMENT
52 y/o M with PMH of DM presenting with chest pain. Patient states he has been having episodes of chest pain with associated syncope, SOB, nausea diaphoresis. States during syncope his body shuts down for few seconds at time. Episodes happen at random. Patient states he does not fall during these episodes. No urinary incontinence or noted shaking. Patient has not seen MD for this problem, dealing with for months.  Does not have symptoms at this time.

## 2022-02-15 NOTE — H&P ADULT - NSHPLABSRESULTS_GEN_ALL_CORE
LABS:                        14.3   4.85  )-----------( 204      ( 15 Feb 2022 06:59 )             44.5     02-15    138  |  101  |  22  ----------------------------<  99  3.6   |  24  |  1.47<H>    Ca    9.6      15 Feb 2022 06:59    TPro  7.4  /  Alb  4.8  /  TBili  0.3  /  DBili  x   /  AST  20  /  ALT  19  /  AlkPhos  64  02-15            RADIOLOGY & ADDITIONAL TESTS:

## 2022-02-15 NOTE — H&P ADULT - HISTORY OF PRESENT ILLNESS
52 y/o male PMH of DM(II), presenting with chest pain eval. Patient states he has been having episodes of chest pain with associated syncope for 6 months, some SOB, nausea diaphoresis as well. States during syncope his body shuts down for few seconds at time. Episodes happen at random. Patient states he does not fall during these episodes. No urinary incontinence or noted shaking. Patient has not seen MD for this problem, dealing with this for months.  Does not have symptoms at this time, VSS in ER.

## 2022-02-16 ENCOUNTER — TRANSCRIPTION ENCOUNTER (OUTPATIENT)
Age: 52
End: 2022-02-16

## 2022-02-16 LAB
A1C WITH ESTIMATED AVERAGE GLUCOSE RESULT: 6.4 % — HIGH (ref 4–5.6)
CHOLEST SERPL-MCNC: 247 MG/DL — HIGH
CK SERPL-CCNC: 159 U/L — SIGNIFICANT CHANGE UP (ref 30–200)
CK SERPL-CCNC: 187 U/L — SIGNIFICANT CHANGE UP (ref 30–200)
CRP SERPL-MCNC: <3 MG/L — SIGNIFICANT CHANGE UP (ref 0–4)
ERYTHROCYTE [SEDIMENTATION RATE] IN BLOOD: 20 MM/HR — SIGNIFICANT CHANGE UP (ref 0–20)
ESTIMATED AVERAGE GLUCOSE: 137 MG/DL — HIGH (ref 68–114)
HDLC SERPL-MCNC: 54 MG/DL — SIGNIFICANT CHANGE UP
HIV 1 & 2 AB SERPL IA.RAPID: SIGNIFICANT CHANGE UP
LIPID PNL WITH DIRECT LDL SERPL: 174 MG/DL — HIGH
NON HDL CHOLESTEROL: 193 MG/DL — HIGH
T4 FREE SERPL-MCNC: 1 NG/DL — SIGNIFICANT CHANGE UP (ref 0.9–1.8)
TRIGL SERPL-MCNC: 93 MG/DL — SIGNIFICANT CHANGE UP
TROPONIN T, HIGH SENSITIVITY RESULT: 10 NG/L — SIGNIFICANT CHANGE UP (ref 0–51)
TSH SERPL-MCNC: 1.61 UIU/ML — SIGNIFICANT CHANGE UP (ref 0.27–4.2)
VIT B12 SERPL-MCNC: 1107 PG/ML — SIGNIFICANT CHANGE UP (ref 232–1245)

## 2022-02-16 PROCEDURE — 93018 CV STRESS TEST I&R ONLY: CPT

## 2022-02-16 PROCEDURE — 78452 HT MUSCLE IMAGE SPECT MULT: CPT | Mod: 26

## 2022-02-16 PROCEDURE — 99223 1ST HOSP IP/OBS HIGH 75: CPT | Mod: GC

## 2022-02-16 PROCEDURE — 93016 CV STRESS TEST SUPVJ ONLY: CPT

## 2022-02-16 RX ORDER — INFLUENZA VIRUS VACCINE 15; 15; 15; 15 UG/.5ML; UG/.5ML; UG/.5ML; UG/.5ML
0.5 SUSPENSION INTRAMUSCULAR ONCE
Refills: 0 | Status: DISCONTINUED | OUTPATIENT
Start: 2022-02-16 | End: 2022-02-17

## 2022-02-16 RX ORDER — EMPAGLIFLOZIN 10 MG/1
1 TABLET, FILM COATED ORAL
Qty: 0 | Refills: 0 | DISCHARGE

## 2022-02-16 RX ADMIN — Medication 81 MILLIGRAM(S): at 12:21

## 2022-02-16 RX ADMIN — SIMVASTATIN 20 MILLIGRAM(S): 20 TABLET, FILM COATED ORAL at 21:30

## 2022-02-16 RX ADMIN — LISINOPRIL 20 MILLIGRAM(S): 2.5 TABLET ORAL at 05:56

## 2022-02-16 NOTE — PATIENT PROFILE ADULT - VISION (WITH CORRECTIVE LENSES IF THE PATIENT USUALLY WEARS THEM):
wears glasses for reading/. Pt has one pair with him/Normal vision: sees adequately in most situations; can see medication labels, newsprint

## 2022-02-16 NOTE — DISCHARGE NOTE PROVIDER - PROVIDER TOKENS
PROVIDER:[TOKEN:[[id number]]] PROVIDER:[TOKEN:[[id number]]],PROVIDER:[TOKEN:[[id number]],FOLLOWUP:[1 week]],PROVIDER:[TOKEN:[[id number]],FOLLOWUP:[1 week]]

## 2022-02-16 NOTE — DISCHARGE NOTE PROVIDER - CARE PROVIDER_API CALL
Rafael Roth (MD)  Clinical Neurophysiology; EEGEpilepsy; Neurology; Sleep Medicine  [address] [place] [address]  Phone: [phone]  Fax: [phone]  Follow Up Time:    Rafael Roth (MD)  Clinical Neurophysiology; EEGEpilepsy; Neurology; Sleep Medicine  [address] [place] [address]  Phone: [phone]  Fax: [phone]  Follow Up Time:     Obdulia Warren  [facility] MEDICINE  [address] [place] [address]  Phone: [phone]  Fax: [phone]  Follow Up Time: 1 week    Lorna Mccurdy  CARDIOVASCULAR DISEASE  [address] [place] [address], [facility] [address]  [place] [address]  Phone: [phone]  Fax: [phone]  Follow Up Time: 1 week

## 2022-02-16 NOTE — PROGRESS NOTE ADULT - ASSESSMENT
50 y/o male PMH of DM(II), presenting with chest pain eval. Patient states he has been having episodes of chest pain with associated syncope for 6 months, some SOB, nausea diaphoresis as well. States during syncope his body shuts down for few seconds at time. Episodes happen at random. Patient states he does not fall during these episodes. No urinary incontinence or noted shaking. Patient has not seen MD for this problem, dealing with this for months.  Does not have symptoms at this time, VSS in ER.     Plan: Admit to [facility]. TTE, stress test in AM.  Will continue home meds of ASA, Lisinopril and Zocur. Lipid panel and A1C in AM. No prior stress test. Labs and trop all negative.   No active infection.     CT head no acute findings. Brain MRI non-contrast as well.     If above normal discharge home in AM.      52 y/o male PMH of DM(II), presenting with chest pain eval. Patient states he has been having episodes of chest pain with associated syncope for 6 months, some SOB, nausea diaphoresis as well. States during syncope his body shuts down for few seconds at time. Episodes happen at random. Patient states he does not fall during these episodes. No urinary incontinence or noted shaking. Patient has not seen MD for this problem, dealing with this for months.  Does not have symptoms at this time, VSS in ER.       Plan: Admit to tele, [facility]. TTE all normal. Stress test for today. Will continue home meds of ASA, Lisinopril and Zocur. Lipid panel and A1C in AM. No prior stress test. Labs and trop all negative.   No active infection.     CT head no acute findings. Brain MRI normal.      If above normal discharge home today.    50 y/o male PMH of DM(II), presenting with chest pain eval. Patient states he has been having episodes of chest pain with associated syncope for 6 months, some SOB, nausea diaphoresis as well. States during syncope his body shuts down for few seconds at time. Episodes happen at random. Patient states he does not fall during these episodes. No urinary incontinence or noted shaking. Patient has not seen MD for this problem, dealing with this for months.  Does not have symptoms at this time, VSS in ER.       Plan: Admit to tele, [facility]. TTE all normal. Stress test for today. Will continue home meds of ASA, Lisinopril and Zocur. Lipid panel and A1C in AM. No prior stress test. Labs and trop all negative.   No active infection.     CT head no acute findings. Brain MRI normal.      If above normal discharge home today.  Need stress test completed and read.

## 2022-02-16 NOTE — PATIENT PROFILE ADULT - STATED REASON FOR ADMISSION
Pt pt, "I came because I was having numbness (for a few hours) on the right side of my body and I knew I had cholesterol issues.) Per pt, "I came because I was having numbness (for a few hours) on the right side of my body and I knew I had cholesterol issues.)

## 2022-02-16 NOTE — DISCHARGE NOTE PROVIDER - NSDCMRMEDTOKEN_GEN_ALL_CORE_FT
Jardiance 10 mg oral tablet: 1 tab(s) orally once a day (in the morning)  lisinopril 5 mg oral tablet: 1 tab(s) orally once a day  simvastatin 40 mg oral tablet: 1 tab(s) orally once a day (at bedtime)   Jardiance 10 mg oral tablet: 1 tab(s) orally once a day (in the morning)  lisinopril 5 mg oral tablet: 1 tab(s) orally once a day   Jardiance 10 mg oral tablet: 1 tab(s) orally once a day (in the morning)   Jardiance 10 mg oral tablet: 1 tab(s) orally once a day (in the morning)  lisinopril 10 mg oral tablet: 1 tab(s) orally once a day   simvastatin 40 mg oral tablet: 1 tab(s) orally once a day (at bedtime)

## 2022-02-16 NOTE — PROGRESS NOTE ADULT - SUBJECTIVE AND OBJECTIVE BOX
INTERVAL HPI/OVERNIGHT EVENTS:  Pt seen and examined at bedside.     Allergies/Intolerance: No Known Allergies      MEDICATIONS  (STANDING):  aspirin enteric coated 81 milliGRAM(s) Oral daily  lisinopril 20 milliGRAM(s) Oral daily  simvastatin 20 milliGRAM(s) Oral at bedtime    MEDICATIONS  (PRN):        ROS: all systems reviewed and wnl      PHYSICAL EXAMINATION:  Vital Signs Last 24 Hrs  T(C): 36.6 (16 Feb 2022 05:17), Max: 36.8 (15 Feb 2022 17:37)  T(F): 97.8 (16 Feb 2022 05:17), Max: 98.2 (15 Feb 2022 17:37)  HR: 65 (16 Feb 2022 05:17) (56 - 65)  BP: 119/74 (16 Feb 2022 05:17) (113/71 - 148/94)  BP(mean): --  RR: 18 (16 Feb 2022 05:17) (17 - 18)  SpO2: 96% (16 Feb 2022 05:17) (96% - 99%)  CAPILLARY BLOOD GLUCOSE            GENERAL: stable in bed, no new CP or SOB  NECK: supple, No JVD  CHEST/LUNG: clear to auscultation bilaterally; no rales, rhonchi, or wheezing b/l  HEART: normal S1, S2  ABDOMEN: BS+, soft, ND, NT   EXTREMITIES:  pulses palpable; no clubbing, cyanosis, or edema b/l LEs  SKIN: no rashes or lesions      LABS:                        14.3   4.85  )-----------( 204      ( 15 Feb 2022 06:59 )             44.5     02-15    138  |  101  |  22  ----------------------------<  99  3.6   |  24  |  1.47<H>    Ca    9.6      15 Feb 2022 06:59    TPro  7.4  /  Alb  4.8  /  TBili  0.3  /  DBili  x   /  AST  20  /  ALT  19  /  AlkPhos  64  02-15               INTERVAL HPI/OVERNIGHT EVENTS:  Pt seen and examined at bedside.     Allergies/Intolerance: No Known Allergies      MEDICATIONS  (STANDING):  aspirin enteric coated 81 milliGRAM(s) Oral daily  lisinopril 20 milliGRAM(s) Oral daily  simvastatin 20 milliGRAM(s) Oral at bedtime    MEDICATIONS  (PRN):        ROS: all systems reviewed and wnl      PHYSICAL EXAMINATION:  Vital Signs Last 24 Hrs  T(C): 36.6 (16 Feb 2022 05:17), Max: 36.8 (15 Feb 2022 17:37)  T(F): 97.8 (16 Feb 2022 05:17), Max: 98.2 (15 Feb 2022 17:37)  HR: 65 (16 Feb 2022 05:17) (56 - 65)  BP: 119/74 (16 Feb 2022 05:17) (113/71 - 148/94)  BP(mean): --  RR: 18 (16 Feb 2022 05:17) (17 - 18)  SpO2: 96% (16 Feb 2022 05:17) (96% - 99%)  CAPILLARY BLOOD GLUCOSE            GENERAL: stable in bed, no new CP or SOB, eager for discharge.   NECK: supple, No JVD  CHEST/LUNG: clear to auscultation bilaterally; no rales, rhonchi, or wheezing b/l  HEART: normal S1, S2  ABDOMEN: BS+, soft, ND, NT   EXTREMITIES:  pulses palpable; no clubbing, cyanosis, or edema b/l LEs  SKIN: no rashes or lesions      LABS:                        14.3   4.85  )-----------( 204      ( 15 Feb 2022 06:59 )             44.5     02-15    138  |  101  |  22  ----------------------------<  99  3.6   |  24  |  1.47<H>    Ca    9.6      15 Feb 2022 06:59    TPro  7.4  /  Alb  4.8  /  TBili  0.3  /  DBili  x   /  AST  20  /  ALT  19  /  AlkPhos  64  02-15

## 2022-02-16 NOTE — PATIENT PROFILE ADULT - FALL HARM RISK - UNIVERSAL INTERVENTIONS
Bed in lowest position, wheels locked, appropriate side rails in place/Call bell, personal items and telephone in reach/Instruct patient to call for assistance before getting out of bed or chair/Non-slip footwear when patient is out of bed/[place] to call system/Physically safe environment - no spills, clutter or unnecessary equipment/Purposeful Proactive Rounding/Room/bathroom lighting operational, light cord in reach

## 2022-02-16 NOTE — DISCHARGE NOTE PROVIDER - NSDCCPCAREPLAN_GEN_ALL_CORE_FT
PRINCIPAL DISCHARGE DIAGNOSIS  Diagnosis: Chest pain  Assessment and Plan of Treatment: For the next few days, avoid physical activities that bring on chest pain. Continue physical activities as directed.  Do not smoke.  Avoid drinking alcohol.   Only take over-the-counter or prescription medicine for pain, discomfort, or fever as directed by your caregiver.  Follow your caregiver's suggestions for further testing if your chest pain does not go away.  Keep any follow-up appointments you made. If you do not go to an appointment, you could develop lasting (chronic) problems with pain. If there is any problem keeping an appointment, you must call to reschedule.   SEEK MEDICAL CARE IF:  You think you are having problems from the medicine you are taking. Read your medicine instructions carefully.  Your chest pain does not go away, even after treatment.  You develop a rash with blisters on your chest.  SEEK IMMEDIATE MEDICAL CARE IF:  You have increased chest pain or pain that spreads to your arm, neck, jaw, back, or abdomen.   You develop shortness of breath, an increasing cough, or you are coughing up blood.  You have severe back or abdominal pain, feel nauseous, or vomit.  You develop severe weakness, fainting, or chills.  You have a fever.  THIS IS AN EMERGENCY. Do not wait to see if the pain will go away. Get medical help at once. Call your local emergency services 911. Do not drive yourself to the hospital.      SECONDARY DISCHARGE DIAGNOSES  Diagnosis: Syncope  Assessment and Plan of Treatment: Events of brief LOC without loss of tone and other transient symptoms have strong functional overlay but could have underlying disorder such as seizures or toxic/metabolic effect.   MRI brain unrevealing for cause.   Please follow up with Neurology for   - rEEG to evaluate for focal slowing, epileptiform discharges, or seizures; can be done as outpatient as well as an MRI c-spine w/o as outpatient  Have someone stay with you until you feel stable.  Do not drive, operate machinery, or play sports until your caregiver says it is okay.  Keep all follow-up appointments as directed by your caregiver.   Lie down right away if you start feeling like you might faint. Breathe deeply and steadily. Wait until all the symptoms have passed.Drink enough fluids to keep your urine clear or pale yellow.  If you are taking blood pressure or heart medicine, get up slowly, taking several minutes to sit and then stand. This can reduce dizziness.  SEEK IMMEDIATE MEDICAL CARE IF:  You have a severe headache.  You have unusual pain in the chest, abdomen, or back.  You are bleeding from the mouth or rectum, or you have black or tarry stool.  You have an irregular or very fast heartbeat.  You have pain with breathing.  You have repeated fainting or seizure-like jerking during an episode.  You faint when sitting or lying down.  You have confusion.  You have difficulty walking.  You have severe weakness.  You have vision problems.  If you fainted, call your local emergency services 911.   Do not drive yourself to the hospital       Diagnosis: HTN (hypertension)  Assessment and Plan of Treatment: Low salt diet  Activity as tolerated.  Take all medication as prescribed.  Follow up with your medical doctor for routine blood pressure monitoring at your next visit.  Notify your doctor if you have any of the following symptoms:   Dizziness, Lightheadedness, Blurry vision, Headache, Chest pain, Shortness of breath       Diagnosis: Diabetes mellitus  Assessment and Plan of Treatment: Make sure you get your HgA1c checked every three months.  If you take oral diabetes medications, check your blood glucose two times a day.  It's important not to skip any meals.  Keep a log of your blood glucose results and always take it with you to your doctor appointments.  Keep a list of your current medications including injectables and over the counter medications and bring this medication list with you to all your doctor appointments.  If you have not seen your ophthalmologist this year call for appointment.  Check your feet daily for redness, sores, or openings. Do not self treat. If no improvement in two days call your primary care physician for an appointment.  Low blood sugar (hypoglycemia) is a blood sugar below 70mg/dl. Check your blood sugar if you feel signs/symptoms of hypoglycemia. If your blood sugar is below 70 take 15 grams of carbohydrates (ex 4 oz of apple juice, 3-4 glucose tablets, or 4-6 oz of regular soda) wait 15 minutes and repeat blood sugar to make sure it comes up above 70.  If your blood sugar is above 70 and you are due for a meal, have a meal.  If you are not due for a meal have a snack.  This snack helps keeps your blood sugar at a safe range.     PRINCIPAL DISCHARGE DIAGNOSIS  Diagnosis: Chest pain  Assessment and Plan of Treatment: For the next few days, avoid physical activities that bring on chest pain. Continue physical activities as directed.  Do not smoke.  Avoid drinking alcohol.   Only take over-the-counter or prescription medicine for pain, discomfort, or fever as directed by your caregiver.  Follow your caregiver's suggestions for further testing if your chest pain does not go away.  Keep any follow-up appointments you made. If you do not go to an appointment, you could develop lasting (chronic) problems with pain. If there is any problem keeping an appointment, you must call to reschedule.   SEEK MEDICAL CARE IF:  You think you are having problems from the medicine you are taking. Read your medicine instructions carefully.  Your chest pain does not go away, even after treatment.  You develop a rash with blisters on your chest.  SEEK IMMEDIATE MEDICAL CARE IF:  You have increased chest pain or pain that spreads to your arm, neck, jaw, back, or abdomen.   You develop shortness of breath, an increasing cough, or you are coughing up blood.  You have severe back or abdominal pain, feel nauseous, or vomit.  You develop severe weakness, fainting, or chills.  You have a fever.  THIS IS AN EMERGENCY. Do not wait to see if the pain will go away. Get medical help at once. Call your local emergency services 911. Do not drive yourself to the hospital.  Stress test was abnormal. Echocardiogram normal  Catheterization was done 2/17 and no intervention were required  Follow up with cardiology      SECONDARY DISCHARGE DIAGNOSES  Diagnosis: Syncope  Assessment and Plan of Treatment: Events of brief LOC without loss of tone and other transient symptoms have strong functional overlay but could have underlying disorder such as seizures or toxic/metabolic effect.   MRI brain unrevealing for cause.   Please follow up with Neurology for   - rEEG to evaluate for focal slowing, epileptiform discharges, or seizures; can be done as outpatient as well as an MRI c-spine w/o as outpatient  Have someone stay with you until you feel stable.  Do not drive, operate machinery, or play sports until your caregiver says it is okay.  Keep all follow-up appointments as directed by your caregiver.   Lie down right away if you start feeling like you might faint. Breathe deeply and steadily. Wait until all the symptoms have passed.Drink enough fluids to keep your urine clear or pale yellow.  If you are taking blood pressure or heart medicine, get up slowly, taking several minutes to sit and then stand. This can reduce dizziness.  SEEK IMMEDIATE MEDICAL CARE IF:  You have a severe headache.  You have unusual pain in the chest, abdomen, or back.  You are bleeding from the mouth or rectum, or you have black or tarry stool.  You have an irregular or very fast heartbeat.  You have pain with breathing.  You have repeated fainting or seizure-like jerking during an episode.  You faint when sitting or lying down.  You have confusion.  You have difficulty walking.  You have severe weakness.  You have vision problems.  If you fainted, call your local emergency services 911.   Do not drive yourself to the hospital   Al imaging of head was without acute findings. EEG did not find evidence of seizures  Follow up with neurology      Diagnosis: HTN (hypertension)  Assessment and Plan of Treatment: Low salt diet  Activity as tolerated.  Take all medication as prescribed.  Follow up with your medical doctor for routine blood pressure monitoring at your next visit.  Notify your doctor if you have any of the following symptoms:   Dizziness, Lightheadedness, Blurry vision, Headache, Chest pain, Shortness of breath       Diagnosis: Diabetes mellitus  Assessment and Plan of Treatment: Make sure you get your HgA1c checked every three months.  If you take oral diabetes medications, check your blood glucose two times a day.  It's important not to skip any meals.  Keep a log of your blood glucose results and always take it with you to your doctor appointments.  Keep a list of your current medications including injectables and over the counter medications and bring this medication list with you to all your doctor appointments.  If you have not seen your ophthalmologist this year call for appointment.  Check your feet daily for redness, sores, or openings. Do not self treat. If no improvement in two days call your primary care physician for an appointment.  Low blood sugar (hypoglycemia) is a blood sugar below 70mg/dl. Check your blood sugar if you feel signs/symptoms of hypoglycemia. If your blood sugar is below 70 take 15 grams of carbohydrates (ex 4 oz of apple juice, 3-4 glucose tablets, or 4-6 oz of regular soda) wait 15 minutes and repeat blood sugar to make sure it comes up above 70.  If your blood sugar is above 70 and you are due for a meal, have a meal.  If you are not due for a meal have a snack.  This snack helps keeps your blood sugar at a safe range.     PRINCIPAL DISCHARGE DIAGNOSIS  Diagnosis: Chest pain  Assessment and Plan of Treatment: For the next few days, avoid physical activities that bring on chest pain. Continue physical activities as directed.  Do not smoke.  Avoid drinking alcohol.   Only take over-the-counter or prescription medicine for pain, discomfort, or fever as directed by your caregiver.  Follow your caregiver's suggestions for further testing if your chest pain does not go away.  Keep any follow-up appointments you made. If you do not go to an appointment, you could develop lasting (chronic) problems with pain. If there is any problem keeping an appointment, you must call to reschedule.   SEEK MEDICAL CARE IF:  You think you are having problems from the medicine you are taking. Read your medicine instructions carefully.  Your chest pain does not go away, even after treatment.  You develop a rash with blisters on your chest.  SEEK IMMEDIATE MEDICAL CARE IF:  You have increased chest pain or pain that spreads to your arm, neck, jaw, back, or abdomen.   You develop shortness of breath, an increasing cough, or you are coughing up blood.  You have severe back or abdominal pain, feel nauseous, or vomit.  You develop severe weakness, fainting, or chills.  You have a fever.  THIS IS AN EMERGENCY. Do not wait to see if the pain will go away. Get medical help at once. Call your local emergency services 911. Do not drive yourself to the hospital.  Stress test was abnormal. Echocardiogram normal  Catheterization was done 2/17 and no intervention were required  Follow up with cardiology      SECONDARY DISCHARGE DIAGNOSES  Diagnosis: Syncope  Assessment and Plan of Treatment: Events of brief LOC without loss of tone and other transient symptoms have strong functional overlay but could have underlying disorder such as seizures or toxic/metabolic effect.   MRI brain unrevealing for cause.   Please follow up with Neurology for   - rEEG to evaluate for focal slowing, epileptiform discharges, or seizures; can be done as outpatient as well as an MRI c-spine w/o as outpatient  Have someone stay with you until you feel stable.  Do not drive, operate machinery, or play sports until your caregiver says it is okay.  Keep all follow-up appointments as directed by your caregiver.   Lie down right away if you start feeling like you might faint. Breathe deeply and steadily. Wait until all the symptoms have passed.Drink enough fluids to keep your urine clear or pale yellow.  If you are taking blood pressure or heart medicine, get up slowly, taking several minutes to sit and then stand. This can reduce dizziness.  SEEK IMMEDIATE MEDICAL CARE IF:  You have a severe headache.  You have unusual pain in the chest, abdomen, or back.  You are bleeding from the mouth or rectum, or you have black or tarry stool.  You have an irregular or very fast heartbeat.  You have pain with breathing.  You have repeated fainting or seizure-like jerking during an episode.  You faint when sitting or lying down.  You have confusion.  You have difficulty walking.  You have severe weakness.  You have vision problems.  If you fainted, call your local emergency services 911.   Do not drive yourself to the hospital   Al imaging of head was without acute findings. EEG did not find evidence of seizures  Follow up with neurology  MRI of C spine as outpatient      Diagnosis: HTN (hypertension)  Assessment and Plan of Treatment: Low salt diet  Activity as tolerated.  Take all medication as prescribed.  Follow up with your medical doctor for routine blood pressure monitoring at your next visit.  Notify your doctor if you have any of the following symptoms:   Dizziness, Lightheadedness, Blurry vision, Headache, Chest pain, Shortness of breath       Diagnosis: Diabetes mellitus  Assessment and Plan of Treatment: Make sure you get your HgA1c checked every three months.  If you take oral diabetes medications, check your blood glucose two times a day.  It's important not to skip any meals.  Keep a log of your blood glucose results and always take it with you to your doctor appointments.  Keep a list of your current medications including injectables and over the counter medications and bring this medication list with you to all your doctor appointments.  If you have not seen your ophthalmologist this year call for appointment.  Check your feet daily for redness, sores, or openings. Do not self treat. If no improvement in two days call your primary care physician for an appointment.  Low blood sugar (hypoglycemia) is a blood sugar below 70mg/dl. Check your blood sugar if you feel signs/symptoms of hypoglycemia. If your blood sugar is below 70 take 15 grams of carbohydrates (ex 4 oz of apple juice, 3-4 glucose tablets, or 4-6 oz of regular soda) wait 15 minutes and repeat blood sugar to make sure it comes up above 70.  If your blood sugar is above 70 and you are due for a meal, have a meal.  If you are not due for a meal have a snack.  This snack helps keeps your blood sugar at a safe range.

## 2022-02-16 NOTE — DISCHARGE NOTE PROVIDER - NSDCFUADDAPPT_GEN_ALL_CORE_FT
Functional quadriplegia APPTS ARE READY TO BE MADE: [x] YES    Best Family or Patient Contact (if needed):    Additional Information about above appointments (if needed):    1: follow up with primary care doctor  2:   3:     Other comments or requests:

## 2022-02-16 NOTE — PROGRESS NOTE ADULT - SUBJECTIVE AND OBJECTIVE BOX
CARDIOLOGY     PROGRESS  NOTE   ________________________________________________    CHIEF COMPLAINT:Patient is a 51y old  Male who presents with a chief complaint of Chest pain eval. (16 Feb 2022 09:10)  no complain.  	  REVIEW OF SYSTEMS:  CONSTITUTIONAL: No fever, weight loss, or fatigue  EYES: No eye pain, visual disturbances, or discharge  ENT:  No difficulty hearing, tinnitus, vertigo; No sinus or throat pain  NECK: No pain or stiffness  RESPIRATORY: No cough, wheezing, chills or hemoptysis; No Shortness of Breath  CARDIOVASCULAR: No chest pain, palpitations, passing out, dizziness, or leg swelling  GASTROINTESTINAL: No abdominal or epigastric pain. No nausea, vomiting, or hematemesis; No diarrhea or constipation. No melena or hematochezia.  GENITOURINARY: No dysuria, frequency, hematuria, or incontinence  NEUROLOGICAL: No headaches, memory loss, loss of strength, numbness, or tremors  SKIN: No itching, burning, rashes, or lesions   LYMPH Nodes: No enlarged glands  ENDOCRINE: No heat or cold intolerance; No hair loss  MUSCULOSKELETAL: No joint pain or swelling; No muscle, back, or extremity pain  PSYCHIATRIC: No depression, anxiety, mood swings, or difficulty sleeping  HEME/LYMPH: No easy bruising, or bleeding gums  ALLERGY AND IMMUNOLOGIC: No hives or eczema	    [ ] All others negative	  [ ] Unable to obtain    PHYSICAL EXAM:  T(C): 36.6 (02-16-22 @ 05:17), Max: 36.8 (02-15-22 @ 17:37)  HR: 65 (02-16-22 @ 05:17) (56 - 65)  BP: 119/74 (02-16-22 @ 05:17) (113/71 - 148/94)  RR: 18 (02-16-22 @ 05:17) (17 - 18)  SpO2: 96% (02-16-22 @ 05:17) (96% - 99%)  Wt(kg): --  I&O's Summary      Appearance: Normal	  HEENT:   Normal oral mucosa, PERRL, EOMI	  Lymphatic: No lymphadenopathy  Cardiovascular: Normal S1 S2, No JVD, No murmurs, No edema  Respiratory: Lungs clear to auscultation	  Psychiatry: A & O x 3, Mood & affect appropriate  Gastrointestinal:  Soft, Non-tender, + BS	  Skin: No rashes, No ecchymoses, No cyanosis	  Neurologic: Non-focal  Extremities: Normal range of motion, No clubbing, cyanosis or edema  Vascular: Peripheral pulses palpable 2+ bilaterally    MEDICATIONS  (STANDING):  aspirin enteric coated 81 milliGRAM(s) Oral daily  lisinopril 20 milliGRAM(s) Oral daily  simvastatin 20 milliGRAM(s) Oral at bedtime      TELEMETRY: 	    ECG:  	  RADIOLOGY:  OTHER: 	  	  LABS:	 	    CARDIAC MARKERS:  CARDIAC MARKERS ( 16 Feb 2022 05:54 )  x     / x     / 159 U/L / x     / x                                    14.3   4.85  )-----------( 204      ( 15 Feb 2022 06:59 )             44.5     02-15    138  |  101  |  22  ----------------------------<  99  3.6   |  24  |  1.47<H>    Ca    9.6      15 Feb 2022 06:59    TPro  7.4  /  Alb  4.8  /  TBili  0.3  /  DBili  x   /  AST  20  /  ALT  19  /  AlkPhos  64  02-15    proBNP:   Lipid Profile:   HgA1c:   TSH:   < from: 12 Lead ECG (02.15.22 @ 07:00) >  Diagnosis Line *** POOR DATA QUALITY, INTERPRETATION MAY BE ADVERSELY AFFECTED  NORMAL SINUS RHYTHM  NONSPECIFIC T WAVE ABNORMALITY  NO PREVIOUS ECGS AVAILABLE            Assessment and plan  ---------------------------  52 y/o male PMH of DM(II), presenting with chest pain eval. Patient states he has been having episodes of chest pain with associated syncope for 6 months, some SOB, nausea diaphoresis as well. States during syncope his body shuts down for few seconds at time. Episodes happen at random. Patient states he does not fall during these episodes. No urinary incontinence or noted shaking. Patient has not seen MD for this problem, dealing with this for months.  Does not have symptoms at this time, VSS in ER.   pt with chest pain ?atypical  awaiting stress test   asa daily  echo no WMA  lipid panel  d dimer  check orthostatic  tele  pt seems to be very anxious

## 2022-02-16 NOTE — DISCHARGE NOTE PROVIDER - HOSPITAL COURSE
52 y/o male PMH of DM(II), presenting with chest pain eval. Patient states he has been having episodes of chest pain with associated syncope for 6 months, some SOB, nausea diaphoresis as well. States during syncope his body shuts down for few seconds at time. Episodes happen at random. Patient states he does not fall during these episodes. No urinary incontinence or noted shaking. Patient has not seen MD for this problem, dealing with this for months.   52 y/o male PMH of DM(II), presenting with chest pain eval. Patient states he has been having episodes of chest pain with associated syncope for 6 months, some SOB, nausea diaphoresis as well. States during syncope his body shuts down for few seconds at time. Episodes happen at random. Patient states he does not fall during these episodes. No urinary incontinence or noted shaking. Patient has not seen MD for this problem, dealing with this for months.    Hospital Course:  #chest pain  - CTA neg for PE and dissection  - trop neg x 3  - NST abnormal on 2/16  - cath 2/17    # syncope  - orthostatics neg  - CTH & MR neg, TTE normal 52 y/o male PMH of DM(II), presenting with chest pain eval. Patient states he has been having episodes of chest pain with associated syncope for 6 months, some SOB, nausea diaphoresis as well. States during syncope his body shuts down for few seconds at time. Episodes happen at random. Patient states he does not fall during these episodes. No urinary incontinence or noted shaking. Patient has not seen MD for this problem, dealing with this for months.    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 No interventions  - f/u cardiology    # syncope  - orthostatics neg  - CTH & MR neg, TTE normal  - EEG  - f/u neurology 50 y/o male PMH of DM(II), presenting with chest pain eval. Patient states he has been having episodes of chest pain with associated syncope for 6 months, some SOB, nausea diaphoresis as well. States during syncope his body shuts down for few seconds at time. Episodes happen at random. Patient states he does not fall during these episodes. No urinary incontinence or noted shaking. Patient has not seen MD for this problem, dealing with this for months.    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 No interventions  - f/u cardiology    # syncope  - orthostatics neg  - CTH & MR neg, TTE normal  - EEG w/p any seizure activity  - f/u neurology    Continue home meds.  PT recommendations: 52 y/o male PMH of DM(II), presenting with chest pain eval. Patient states he has been having episodes of chest pain with associated syncope for 6 months, some SOB, nausea diaphoresis as well. States during syncope his body shuts down for few seconds at time. Episodes happen at random. Patient states he does not fall during these episodes. No urinary incontinence or noted shaking. Patient has not seen MD for this problem, dealing with this for months.    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 No interventions  - f/u cardiology    # syncope  - orthostatics neg  - CTH & MR neg, TTE normal  - EEG w/p any seizure activity  - f/u neurology  - MR C spine as outpt    Continue home meds.  PT recommendations:  Pt HDS. Med rec to be reviewed prior to discharge. 50 y/o male PMH of DM(II), presenting with chest pain eval. Patient states he has been having episodes of chest pain with associated syncope for 6 months, some SOB, nausea diaphoresis as well. States during syncope his body shuts down for few seconds at time. Episodes happen at random. Patient states he does not fall during these episodes. No urinary incontinence or noted shaking. Patient has not seen MD for this problem, dealing with this for months.    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 No interventions  - f/u cardiology as outpt    # syncope  - orthostatics neg  - CTH & MR neg, TTE normal  - EEG w/p any seizure activity  - f/u neurology  - MR C spine as outpt    Continue home meds at adjusted doses  Pt HDS. Safe for dispo as discussed with Dr. Norton. Med rec to be reviewed prior to discharge. 50 y/o male PMH of DM(II), presenting with chest pain eval. Patient states he has been having episodes of chest pain with associated syncope for 6 months, some SOB, nausea diaphoresis as well. States during syncope his body shuts down for few seconds at time. Episodes happen at random. Patient states he does not fall during these episodes. No urinary incontinence or noted shaking. Patient has not seen MD for this problem, dealing with this for months.    Hospital Course:  #chest pain resolved.  CTA neg for PE and dissection  - trop neg x 3. Stress test was abnormal on 2/16. 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 No interventions  - f/u cardiology as outpt. No stents were needed.      # syncope  - orthostatics neg  - CTH & MR neg, TTE normal  - EEG w/p any seizure activity  - f/u neurology  - MRI brain all normal.     Continue home meds at adjusted doses  Pt HDS. Safe for dispo as discussed with Dr. Norton. Med rec to be reviewed prior to discharge.

## 2022-02-17 ENCOUNTER — TRANSCRIPTION ENCOUNTER (OUTPATIENT)
Age: 52
End: 2022-02-17

## 2022-02-17 VITALS
RESPIRATION RATE: 17 BRPM | OXYGEN SATURATION: 93 % | SYSTOLIC BLOOD PRESSURE: 106 MMHG | DIASTOLIC BLOOD PRESSURE: 66 MMHG | TEMPERATURE: 98 F | HEART RATE: 72 BPM

## 2022-02-17 LAB
ANION GAP SERPL CALC-SCNC: 14 MMOL/L — SIGNIFICANT CHANGE UP (ref 5–17)
BUN SERPL-MCNC: 24 MG/DL — HIGH (ref 7–23)
CALCIUM SERPL-MCNC: 9.3 MG/DL — SIGNIFICANT CHANGE UP (ref 8.4–10.5)
CHLORIDE SERPL-SCNC: 101 MMOL/L — SIGNIFICANT CHANGE UP (ref 96–108)
CO2 SERPL-SCNC: 21 MMOL/L — LOW (ref 22–31)
CREAT SERPL-MCNC: 1.48 MG/DL — HIGH (ref 0.5–1.3)
GLUCOSE BLDC GLUCOMTR-MCNC: 120 MG/DL — HIGH (ref 70–99)
GLUCOSE BLDC GLUCOMTR-MCNC: 90 MG/DL — SIGNIFICANT CHANGE UP (ref 70–99)
GLUCOSE SERPL-MCNC: 97 MG/DL — SIGNIFICANT CHANGE UP (ref 70–99)
HCT VFR BLD CALC: 43 % — SIGNIFICANT CHANGE UP (ref 39–50)
HGB BLD-MCNC: 14.1 G/DL — SIGNIFICANT CHANGE UP (ref 13–17)
MCHC RBC-ENTMCNC: 24.7 PG — LOW (ref 27–34)
MCHC RBC-ENTMCNC: 32.8 GM/DL — SIGNIFICANT CHANGE UP (ref 32–36)
MCV RBC AUTO: 75.2 FL — LOW (ref 80–100)
NRBC # BLD: 0 /100 WBCS — SIGNIFICANT CHANGE UP (ref 0–0)
PLATELET # BLD AUTO: 247 K/UL — SIGNIFICANT CHANGE UP (ref 150–400)
POTASSIUM SERPL-MCNC: 3.8 MMOL/L — SIGNIFICANT CHANGE UP (ref 3.5–5.3)
POTASSIUM SERPL-SCNC: 3.8 MMOL/L — SIGNIFICANT CHANGE UP (ref 3.5–5.3)
RBC # BLD: 5.72 M/UL — SIGNIFICANT CHANGE UP (ref 4.2–5.8)
RBC # FLD: 14.6 % — HIGH (ref 10.3–14.5)
SODIUM SERPL-SCNC: 136 MMOL/L — SIGNIFICANT CHANGE UP (ref 135–145)
T PALLIDUM AB TITR SER: NEGATIVE — SIGNIFICANT CHANGE UP
VIT D25+D1,25 OH+D1,25 PNL SERPL-MCNC: 40.2 PG/ML — SIGNIFICANT CHANGE UP (ref 19.9–79.3)
WBC # BLD: 3.65 K/UL — LOW (ref 3.8–10.5)
WBC # FLD AUTO: 3.65 K/UL — LOW (ref 3.8–10.5)

## 2022-02-17 PROCEDURE — 71275 CT ANGIOGRAPHY CHEST: CPT | Mod: MA

## 2022-02-17 PROCEDURE — 99152 MOD SED SAME PHYS/QHP 5/>YRS: CPT

## 2022-02-17 PROCEDURE — 96375 TX/PRO/DX INJ NEW DRUG ADDON: CPT | Mod: XU

## 2022-02-17 PROCEDURE — 82550 ASSAY OF CK (CPK): CPT

## 2022-02-17 PROCEDURE — U0005: CPT

## 2022-02-17 PROCEDURE — C1769: CPT

## 2022-02-17 PROCEDURE — 84484 ASSAY OF TROPONIN QUANT: CPT

## 2022-02-17 PROCEDURE — 93454 CORONARY ARTERY ANGIO S&I: CPT | Mod: 26

## 2022-02-17 PROCEDURE — 71046 X-RAY EXAM CHEST 2 VIEWS: CPT

## 2022-02-17 PROCEDURE — 86780 TREPONEMA PALLIDUM: CPT

## 2022-02-17 PROCEDURE — 93454 CORONARY ARTERY ANGIO S&I: CPT

## 2022-02-17 PROCEDURE — 86703 HIV-1/HIV-2 1 RESULT ANTBDY: CPT

## 2022-02-17 PROCEDURE — 85027 COMPLETE CBC AUTOMATED: CPT

## 2022-02-17 PROCEDURE — A9500: CPT

## 2022-02-17 PROCEDURE — 86140 C-REACTIVE PROTEIN: CPT

## 2022-02-17 PROCEDURE — 80061 LIPID PANEL: CPT

## 2022-02-17 PROCEDURE — C1894: CPT

## 2022-02-17 PROCEDURE — 93005 ELECTROCARDIOGRAM TRACING: CPT

## 2022-02-17 PROCEDURE — 93010 ELECTROCARDIOGRAM REPORT: CPT

## 2022-02-17 PROCEDURE — 82652 VIT D 1 25-DIHYDROXY: CPT

## 2022-02-17 PROCEDURE — 93017 CV STRESS TEST TRACING ONLY: CPT

## 2022-02-17 PROCEDURE — 96374 THER/PROPH/DIAG INJ IV PUSH: CPT

## 2022-02-17 PROCEDURE — 36415 COLL VENOUS BLD VENIPUNCTURE: CPT

## 2022-02-17 PROCEDURE — 84207 ASSAY OF VITAMIN B-6: CPT

## 2022-02-17 PROCEDURE — 74174 CTA ABD&PLVS W/CONTRAST: CPT | Mod: MA

## 2022-02-17 PROCEDURE — 82525 ASSAY OF COPPER: CPT

## 2022-02-17 PROCEDURE — 70551 MRI BRAIN STEM W/O DYE: CPT

## 2022-02-17 PROCEDURE — 70450 CT HEAD/BRAIN W/O DYE: CPT | Mod: MA

## 2022-02-17 PROCEDURE — C1887: CPT

## 2022-02-17 PROCEDURE — 80048 BASIC METABOLIC PNL TOTAL CA: CPT

## 2022-02-17 PROCEDURE — 95816 EEG AWAKE AND DROWSY: CPT | Mod: 26

## 2022-02-17 PROCEDURE — 84425 ASSAY OF VITAMIN B-1: CPT

## 2022-02-17 PROCEDURE — 93306 TTE W/DOPPLER COMPLETE: CPT

## 2022-02-17 PROCEDURE — 80053 COMPREHEN METABOLIC PANEL: CPT

## 2022-02-17 PROCEDURE — 99285 EMERGENCY DEPT VISIT HI MDM: CPT | Mod: 25

## 2022-02-17 PROCEDURE — 85652 RBC SED RATE AUTOMATED: CPT

## 2022-02-17 PROCEDURE — 95816 EEG AWAKE AND DROWSY: CPT

## 2022-02-17 PROCEDURE — 83036 HEMOGLOBIN GLYCOSYLATED A1C: CPT

## 2022-02-17 PROCEDURE — 85025 COMPLETE CBC W/AUTO DIFF WBC: CPT

## 2022-02-17 PROCEDURE — 82607 VITAMIN B-12: CPT

## 2022-02-17 PROCEDURE — 84443 ASSAY THYROID STIM HORMONE: CPT

## 2022-02-17 PROCEDURE — 84439 ASSAY OF FREE THYROXINE: CPT

## 2022-02-17 PROCEDURE — 82962 GLUCOSE BLOOD TEST: CPT

## 2022-02-17 PROCEDURE — U0003: CPT

## 2022-02-17 PROCEDURE — 78452 HT MUSCLE IMAGE SPECT MULT: CPT

## 2022-02-17 RX ORDER — INSULIN LISPRO 100/ML
VIAL (ML) SUBCUTANEOUS
Refills: 0 | Status: DISCONTINUED | OUTPATIENT
Start: 2022-02-17 | End: 2022-02-17

## 2022-02-17 RX ORDER — SODIUM CHLORIDE 9 MG/ML
1000 INJECTION, SOLUTION INTRAVENOUS
Refills: 0 | Status: DISCONTINUED | OUTPATIENT
Start: 2022-02-17 | End: 2022-02-17

## 2022-02-17 RX ORDER — LISINOPRIL 2.5 MG/1
1 TABLET ORAL
Qty: 30 | Refills: 0
Start: 2022-02-17 | End: 2022-03-18

## 2022-02-17 RX ORDER — SIMVASTATIN 20 MG/1
1 TABLET, FILM COATED ORAL
Qty: 0 | Refills: 0 | DISCHARGE

## 2022-02-17 RX ORDER — INSULIN LISPRO 100/ML
VIAL (ML) SUBCUTANEOUS AT BEDTIME
Refills: 0 | Status: DISCONTINUED | OUTPATIENT
Start: 2022-02-17 | End: 2022-02-17

## 2022-02-17 RX ORDER — DEXTROSE 50 % IN WATER 50 %
15 SYRINGE (ML) INTRAVENOUS ONCE
Refills: 0 | Status: DISCONTINUED | OUTPATIENT
Start: 2022-02-17 | End: 2022-02-17

## 2022-02-17 RX ORDER — DEXTROSE 50 % IN WATER 50 %
12.5 SYRINGE (ML) INTRAVENOUS ONCE
Refills: 0 | Status: DISCONTINUED | OUTPATIENT
Start: 2022-02-17 | End: 2022-02-17

## 2022-02-17 RX ORDER — DEXTROSE 50 % IN WATER 50 %
25 SYRINGE (ML) INTRAVENOUS ONCE
Refills: 0 | Status: DISCONTINUED | OUTPATIENT
Start: 2022-02-17 | End: 2022-02-17

## 2022-02-17 RX ORDER — SIMVASTATIN 20 MG/1
1 TABLET, FILM COATED ORAL
Qty: 30 | Refills: 0
Start: 2022-02-17 | End: 2022-03-18

## 2022-02-17 RX ORDER — LISINOPRIL 2.5 MG/1
1 TABLET ORAL
Qty: 0 | Refills: 0 | DISCHARGE

## 2022-02-17 RX ORDER — SODIUM CHLORIDE 9 MG/ML
1000 INJECTION INTRAMUSCULAR; INTRAVENOUS; SUBCUTANEOUS
Refills: 0 | Status: DISCONTINUED | OUTPATIENT
Start: 2022-02-17 | End: 2022-02-17

## 2022-02-17 RX ORDER — ASPIRIN/CALCIUM CARB/MAGNESIUM 324 MG
1 TABLET ORAL
Qty: 30 | Refills: 0
Start: 2022-02-17 | End: 2022-03-18

## 2022-02-17 RX ORDER — GLUCAGON INJECTION, SOLUTION 0.5 MG/.1ML
1 INJECTION, SOLUTION SUBCUTANEOUS ONCE
Refills: 0 | Status: DISCONTINUED | OUTPATIENT
Start: 2022-02-17 | End: 2022-02-17

## 2022-02-17 RX ADMIN — LISINOPRIL 20 MILLIGRAM(S): 2.5 TABLET ORAL at 05:29

## 2022-02-17 RX ADMIN — Medication 81 MILLIGRAM(S): at 11:50

## 2022-02-17 RX ADMIN — SODIUM CHLORIDE 75 MILLILITER(S): 9 INJECTION INTRAMUSCULAR; INTRAVENOUS; SUBCUTANEOUS at 10:58

## 2022-02-17 NOTE — DISCHARGE NOTE NURSING/CASE MANAGEMENT/SOCIAL WORK - NSDCPEFALRISK_GEN_ALL_CORE
For information on Fall & Injury Prevention, visit: [email or web address] OR  [email or web address] OR  https://www.cdc.gov/steadi/patient.html

## 2022-02-17 NOTE — DISCHARGE NOTE NURSING/CASE MANAGEMENT/SOCIAL WORK - PATIENT PORTAL LINK FT
You can access the FollowMyHealth Patient Portal offered by [facility] by registering at the following website: [email or web address]. By joining [facility]’s FollowMyHealth portal, you will also be able to view your health information using other applications (apps) compatible with our system.

## 2022-02-17 NOTE — DISCHARGE NOTE NURSING/CASE MANAGEMENT/SOCIAL WORK - NSDCFUADDAPPT_GEN_ALL_CORE_FT
APPTS ARE READY TO BE MADE: [x] YES    Best Family or Patient Contact (if needed):    Additional Information about above appointments (if needed):    1: follow up with primary care doctor  2:   3:     Other comments or requests:

## 2022-02-17 NOTE — PROGRESS NOTE ADULT - SUBJECTIVE AND OBJECTIVE BOX
CARDIOLOGY     PROGRESS  NOTE   ________________________________________________    CHIEF COMPLAINT:Patient is a 51y old  Male who presents with a chief complaint of Chest pain eval. (17 Feb 2022 09:15)  no complain.  	  REVIEW OF SYSTEMS:  CONSTITUTIONAL: No fever, weight loss, or fatigue  EYES: No eye pain, visual disturbances, or discharge  ENT:  No difficulty hearing, tinnitus, vertigo; No sinus or throat pain  NECK: No pain or stiffness  RESPIRATORY: No cough, wheezing, chills or hemoptysis; No Shortness of Breath  CARDIOVASCULAR: No chest pain, palpitations, passing out, dizziness, or leg swelling  GASTROINTESTINAL: No abdominal or epigastric pain. No nausea, vomiting, or hematemesis; No diarrhea or constipation. No melena or hematochezia.  GENITOURINARY: No dysuria, frequency, hematuria, or incontinence  NEUROLOGICAL: No headaches, memory loss, loss of strength, numbness, or tremors  SKIN: No itching, burning, rashes, or lesions   LYMPH Nodes: No enlarged glands  ENDOCRINE: No heat or cold intolerance; No hair loss  MUSCULOSKELETAL: No joint pain or swelling; No muscle, back, or extremity pain  PSYCHIATRIC: No depression, anxiety, mood swings, or difficulty sleeping  HEME/LYMPH: No easy bruising, or bleeding gums  ALLERGY AND IMMUNOLOGIC: No hives or eczema	    [ ] All others negative	  [ ] Unable to obtain    PHYSICAL EXAM:  T(C): 36.5 (02-17-22 @ 04:13), Max: 36.8 (02-16-22 @ 19:49)  HR: 60 (02-17-22 @ 04:13) (60 - 70)  BP: 118/75 (02-17-22 @ 04:13) (118/75 - 129/68)  RR: 18 (02-17-22 @ 04:13) (18 - 18)  SpO2: 91% (02-17-22 @ 04:13) (91% - 97%)  Wt(kg): --  I&O's Summary    16 Feb 2022 07:01  -  17 Feb 2022 07:00  --------------------------------------------------------  IN: 480 mL / OUT: 0 mL / NET: 480 mL        Appearance: Normal	  HEENT:   Normal oral mucosa, PERRL, EOMI	  Lymphatic: No lymphadenopathy  Cardiovascular: Normal S1 S2, No JVD, + murmurs, No edema  Respiratory: Lungs clear to auscultation	  Psychiatry: A & O x 3, Mood & affect appropriate  Gastrointestinal:  Soft, Non-tender, + BS	  Skin: No rashes, No ecchymoses, No cyanosis	  Neurologic: Non-focal  Extremities: Normal range of motion, No clubbing, cyanosis or edema  Vascular: Peripheral pulses palpable 2+ bilaterally    MEDICATIONS  (STANDING):  aspirin enteric coated 81 milliGRAM(s) Oral daily  dextrose 40% Gel 15 Gram(s) Oral once  dextrose 5%. 1000 milliLiter(s) (50 mL/Hr) IV Continuous <Continuous>  dextrose 5%. 1000 milliLiter(s) (100 mL/Hr) IV Continuous <Continuous>  dextrose 50% Injectable 25 Gram(s) IV Push once  dextrose 50% Injectable 12.5 Gram(s) IV Push once  dextrose 50% Injectable 25 Gram(s) IV Push once  glucagon  Injectable 1 milliGRAM(s) IntraMuscular once  influenza   Vaccine 0.5 milliLiter(s) IntraMuscular once  insulin lispro (ADMELOG) corrective regimen sliding scale   SubCutaneous three times a day before meals  insulin lispro (ADMELOG) corrective regimen sliding scale   SubCutaneous at bedtime  lisinopril 20 milliGRAM(s) Oral daily  simvastatin 20 milliGRAM(s) Oral at bedtime      TELEMETRY: 	    ECG:  	  RADIOLOGY:  OTHER: 	  	  LABS:	 	    CARDIAC MARKERS:  CARDIAC MARKERS ( 16 Feb 2022 11:21 )  x     / x     / 187 U/L / x     / x      CARDIAC MARKERS ( 16 Feb 2022 05:54 )  x     / x     / 159 U/L / x     / x                                    14.1   3.65  )-----------( 247      ( 17 Feb 2022 07:07 )             43.0     02-17    136  |  101  |  24<H>  ----------------------------<  97  3.8   |  21<L>  |  1.48<H>    Ca    9.3      17 Feb 2022 07:05      proBNP:   Lipid Profile: Cholesterol 247  LDL --  HDL 54  TG 93    HgA1c:   TSH: Thyroid Stimulating Hormone, Serum: 1.61 uIU/mL (02-16 @ 16:26)    < from: Nuclear Stress Test-Exercise (Nuclear Stress Test-Exercise .) (02.16.22 @ 17:29) >  * Exercise capacity: 10 METS, Average for age and gender.  * Chest Pain: No chest pain with exercise.  * Symptom: Shortness of breath.  * HR Response: Appropriate.  * BP Response: Appropriate.  * Heart Rhythm: Sinus Rhythm - 60 BPM.  * Q Waves: II , III, aVF.  * Baseline ECG: Nonspecific ST-T wave abnormality.  * ECG Changes: ST Depression: 0.5 mm upsloping in leads  V3, V4, V5 started at 03:00 min of exercise at HR of 104  and persisted 05:00 min into recovery.  * Arrhythmia: Occasional VPDs occurred during stress.  * Review of raw data shows: The study is of good technical  quality.  * The left ventricle was normal in size. There is a large,  mild to moderate defect in anterior wall that is  predominantly fixed, suggestive of infarction with mild  paul-infarct ischemia.  * There are large, mild to moderate defects in inferior  and inferoseptal walls that are predominantly fixed,  suggestive of infarction with mild paul-infarct ischemia.  * Post-stress gated wall motion analysis was performed  (LVEF = 48 %;LVEDV = 81 ml.), revealing hypokinesis in  anterior, inferior, and inferoseptal wall(s).      Assessment and plan  ---------------------------  52 y/o male PMH of DM(II), presenting with chest pain eval. Patient states he has been having episodes of chest pain with associated syncope for 6 months, some SOB, nausea diaphoresis as well. States during syncope his body shuts down for few seconds at time. Episodes happen at random. Patient states he does not fall during these episodes. No urinary incontinence or noted shaking. Patient has not seen MD for this problem, dealing with this for months.  Does not have symptoms at this time, VSS in ER.   pt with chest pain ?atypical  awaiting stress test   asa daily  echo no WMA  lipid panel  d dimer  check orthostatic  tele  pt seems to be very anxious  stress test +, cath    	         Benzoyl Peroxide Counseling: Patient counseled that medicine may cause skin irritation and bleach clothing.  In the event of skin irritation, the patient was advised to reduce the amount of the drug applied or use it less frequently.   The patient verbalized understanding of the proper use and possible adverse effects of benzoyl peroxide.  All of the patient's questions and concerns were addressed.

## 2022-02-17 NOTE — EEG REPORT - NS EEG TEXT BOX
[facility]  [facility]  Report of Routine EEG with Video    [facility]: [address], [place] [address], Phone [phone]  [facility]: [address], [place] [address], Phone [phone]  [place] [facility]: [address], [address], [place] [address] Phone [phone]    [facility]: [address] [place] [address], Phone [phone]  [place] [facility]: [address] [place] [address], Phone [phone]    Patient Name: Karsten Meyer    Age: 51 year  : 1970  Patient ID: -, MRN #: MR # [id number], Location: [address]  Referring Physician: -  EEG #: [id number]    Study Date: 2022     Technical Information:					  On Instrument: -  Placement and Labeling of Electrodes:  The EEG was performed utilizing 20 channels referential EEG connections (coronal over temporal over parasagittal montage) using all standard 10-20 electrode placements with EKG.  Recording was at a sampling rate of 256 samples per second per channel.  Time synchronized digital video recording was done simultaneously with EEG recording.  A low light infrared camera was used for low light recording.  Kilo and seizure detection algorithms were utilized.    History:   Routine study performed at bedside  COR : PT awake and alert  No hv due to covid protocols  Photic performed   52 Y/O male No PMH available   PT presented for chest pain evaluation PT has been having episodes of chest pain associated with syncope and nausea diaphoresis. Body shuts down for a few seconds at a time during these episodes    Pertinent Medication  ZESTRIL  ZOCOR    Study Interpretation:  Findings: The background was continuous and reactive. During wakefulness, the posterior dominant rhythm consisted of symmetric, well-modulated 9 Hz activity, with amplitude to 30 uV, that attenuated to eye opening.     Background Slowing:  No generalized background slowing was present.    Focal Slowing:   None were present.    Sleep Background:  Drowsiness was characterized by fragmentation, attenuation, and slowing of the background activity.    Sleep was characterized by the presence of vertex waves, symmetric sleep spindles and K-complexes.    Other Non-Epileptiform Findings:  None were present.    Interictal Epileptiform Activity:   None were present.    Events:  No event or seizure recorded.    Activation Procedures:   Hyperventilation was not performed.    Photic stimulation was not performed.     Artifacts:  Intermittent myogenic and movement artifacts were noted.    EEG Summary / Classification:  Normal EEG    EEG Impression / Clinical Correlate:  Normal EEG study.  No epileptiform pattern or seizure seen.    Ryan Aaron MD  Attending Physician, [facility] [place]

## 2022-02-17 NOTE — PROGRESS NOTE ADULT - ASSESSMENT
52 y/o male PMH of DM(II), presenting with chest pain eval. Patient states he has been having episodes of chest pain with associated syncope for 6 months, some SOB, nausea diaphoresis as well. States during syncope his body shuts down for few seconds at time. Episodes happen at random. Patient states he does not fall during these episodes. No urinary incontinence or noted shaking. Patient has not seen MD for this problem, dealing with this for months.  Does not have symptoms at this time, VSS in ER.       Plan: Admit to tele, ARELY hurley. TTE all normal. Stress test for today. Will continue home meds of ASA, Lisinopril and Zocur. Lipid panel and A1C in AM. No prior stress test. Labs and trop all negative.   No active infection.     CT head no acute findings. Brain MRI normal.      If above normal discharge home today.    52 y/o male PMH of DM(II), presenting with chest pain eval. Patient states he has been having episodes of chest pain with associated syncope for 6 months, some SOB, nausea diaphoresis as well. States during syncope his body shuts down for few seconds at time. Episodes happen at random. Patient states he does not fall during these episodes. No urinary incontinence or noted shaking. Patient has not seen MD for this problem, dealing with this for months.  Does not have symptoms at this time, VSS in ER.       Plan: Admit to teleARELY. TTE all normal. Stress test was abnormal. Will continue home meds of ASA, Lisinopril and Zocur. Lipid panel and A1C in AM. No prior stress test. Labs and trop all negative.   No active infection.     CT head no acute findings. Brain MRI normal.      Needs cath today.

## 2022-02-17 NOTE — PROGRESS NOTE ADULT - SUBJECTIVE AND OBJECTIVE BOX
INTERVAL HPI/OVERNIGHT EVENTS:  Pt seen and examined at bedside.     Allergies/Intolerance: No Known Drug Allergies  peanuts (Unknown)      MEDICATIONS  (STANDING):  aspirin enteric coated 81 milliGRAM(s) Oral daily  dextrose 40% Gel 15 Gram(s) Oral once  dextrose 5%. 1000 milliLiter(s) (50 mL/Hr) IV Continuous <Continuous>  dextrose 5%. 1000 milliLiter(s) (100 mL/Hr) IV Continuous <Continuous>  dextrose 50% Injectable 25 Gram(s) IV Push once  dextrose 50% Injectable 12.5 Gram(s) IV Push once  dextrose 50% Injectable 25 Gram(s) IV Push once  glucagon  Injectable 1 milliGRAM(s) IntraMuscular once  influenza   Vaccine 0.5 milliLiter(s) IntraMuscular once  insulin lispro (ADMELOG) corrective regimen sliding scale   SubCutaneous three times a day before meals  insulin lispro (ADMELOG) corrective regimen sliding scale   SubCutaneous at bedtime  lisinopril 20 milliGRAM(s) Oral daily  simvastatin 20 milliGRAM(s) Oral at bedtime    MEDICATIONS  (PRN):        ROS: all systems reviewed and wnl      PHYSICAL EXAMINATION:  Vital Signs Last 24 Hrs  T(C): 36.5 (17 Feb 2022 04:13), Max: 36.8 (16 Feb 2022 19:49)  T(F): 97.7 (17 Feb 2022 04:13), Max: 98.3 (16 Feb 2022 19:49)  HR: 60 (17 Feb 2022 04:13) (60 - 70)  BP: 118/75 (17 Feb 2022 04:13) (118/75 - 129/68)  BP(mean): --  RR: 18 (17 Feb 2022 04:13) (18 - 18)  SpO2: 91% (17 Feb 2022 04:13) (91% - 97%)  CAPILLARY BLOOD GLUCOSE          02-16 @ 07:01  -  02-17 @ 07:00  --------------------------------------------------------  IN: 480 mL / OUT: 0 mL / NET: 480 mL        GENERAL: in bed, comfortable, no CP, syncope or SOB   NECK: supple, No JVD  CHEST/LUNG: clear to auscultation bilaterally; no rales, rhonchi, or wheezing b/l  HEART: normal S1, S2  ABDOMEN: BS+, soft, ND, NT   EXTREMITIES:  pulses palpable; no clubbing, cyanosis, or edema b/l LEs  SKIN: no rashes or lesions      LABS:                        14.1   3.65  )-----------( 247      ( 17 Feb 2022 07:07 )             43.0     02-17    136  |  101  |  24<H>  ----------------------------<  97  3.8   |  21<L>  |  1.48<H>    Ca    9.3      17 Feb 2022 07:05

## 2022-02-20 LAB — COPPER SERPL-MCNC: 119 UG/DL — SIGNIFICANT CHANGE UP (ref 69–132)

## 2022-02-26 LAB — PYRIDOXAL PHOS SERPL-MCNC: 47 UG/L — HIGH (ref 5.3–46.7)

## 2022-02-27 NOTE — CHART NOTE - NSCHARTNOTEFT_GEN_A_CORE
Patient requested call back on 2/22/2022
Patient was informed of referrals and timeframe on 02/18/2022 Will outreach again to confirm follow up appt was made.
Patient was informed of referrals and timeframe on 02/19/2022. Will outreach again to confirm follow up appt was made.
After discussion amongst neurology service, okay from a neurology standpoint to undergo exercise stress test.
Left 1 message for the patient in regards to follow up care with callback information.

## 2022-02-28 LAB — VIT B1 SERPL-MCNC: 102.4 NMOL/L — SIGNIFICANT CHANGE UP (ref 66.5–200)

## 2023-01-09 ENCOUNTER — EMERGENCY (EMERGENCY)
Facility: HOSPITAL | Age: 53
LOS: 0 days | Discharge: HOME | End: 2023-01-09
Attending: EMERGENCY MEDICINE
Payer: MEDICAID

## 2023-01-09 VITALS
DIASTOLIC BLOOD PRESSURE: 65 MMHG | SYSTOLIC BLOOD PRESSURE: 130 MMHG | HEART RATE: 65 BPM | OXYGEN SATURATION: 97 % | RESPIRATION RATE: 18 BRPM

## 2023-01-09 VITALS — WEIGHT: 179.9 LBS

## 2023-01-09 DIAGNOSIS — Z91.010 ALLERGY TO PEANUTS: ICD-10-CM

## 2023-01-09 DIAGNOSIS — E11.9 TYPE 2 DIABETES MELLITUS WITHOUT COMPLICATIONS: ICD-10-CM

## 2023-01-09 DIAGNOSIS — M54.9 DORSALGIA, UNSPECIFIED: ICD-10-CM

## 2023-01-09 DIAGNOSIS — M79.18 MYALGIA, OTHER SITE: ICD-10-CM

## 2023-01-09 DIAGNOSIS — Z79.84 LONG TERM (CURRENT) USE OF ORAL HYPOGLYCEMIC DRUGS: ICD-10-CM

## 2023-01-09 DIAGNOSIS — M79.601 PAIN IN RIGHT ARM: ICD-10-CM

## 2023-01-09 DIAGNOSIS — M54.50 LOW BACK PAIN, UNSPECIFIED: ICD-10-CM

## 2023-01-09 PROCEDURE — 96372 THER/PROPH/DIAG INJ SC/IM: CPT

## 2023-01-09 PROCEDURE — 99284 EMERGENCY DEPT VISIT MOD MDM: CPT

## 2023-01-09 PROCEDURE — 99283 EMERGENCY DEPT VISIT LOW MDM: CPT

## 2023-01-09 RX ORDER — KETOROLAC TROMETHAMINE 30 MG/ML
30 SYRINGE (ML) INJECTION ONCE
Refills: 0 | Status: DISCONTINUED | OUTPATIENT
Start: 2023-01-09 | End: 2023-01-09

## 2023-01-09 RX ORDER — DIAZEPAM 5 MG
10 TABLET ORAL ONCE
Refills: 0 | Status: DISCONTINUED | OUTPATIENT
Start: 2023-01-09 | End: 2023-01-09

## 2023-01-09 RX ORDER — CYCLOBENZAPRINE HYDROCHLORIDE 10 MG/1
1 TABLET, FILM COATED ORAL
Qty: 15 | Refills: 0
Start: 2023-01-09 | End: 2023-01-13

## 2023-01-09 RX ADMIN — Medication 30 MILLIGRAM(S): at 13:30

## 2023-01-09 RX ADMIN — Medication 30 MILLIGRAM(S): at 12:28

## 2023-01-09 RX ADMIN — Medication 10 MILLIGRAM(S): at 12:28

## 2023-01-09 RX ADMIN — Medication 50 MILLIGRAM(S): at 12:28

## 2023-01-09 NOTE — ED STATDOCS - MUSCULOSKELETAL, MLM
range of motion is not limited. +SLR on right. Motor 5/5 b/l LE. No midline spinal tenderness. Tenderness with right lower back palpation.

## 2023-01-09 NOTE — ED STATDOCS - ATTENDING APP SHARED VISIT CONTRIBUTION OF CARE
I personally saw the patient with the PETRA, and completed the key components of the history and physical exam. I then discussed the management plan with the PETRA.

## 2023-01-09 NOTE — ED STATDOCS - NS ED ATTENDING STATEMENT MOD
This was a shared visit with the PETRA. I reviewed and verified the documentation and independently performed the documented:

## 2023-01-09 NOTE — ED STATDOCS - PROGRESS NOTE DETAILS
pt seen with ER attending who present for c/o lower back pian that  started as he was getting out of his car and felt pain that radiates down his right buttocks and leg. no hx of similar in the past denies and urine or bowel inconinence pt seen with ER attending who present for c/o lower back pian that  started as he was getting out of his car and felt pain that radiates down his right buttocks and leg. no hx of similar in the past denies and urine or bowel incontinence pt feeling better after medication  plan discussed with the patient rest flexeril, ibuprofen, warm moist heat, PMD follow up

## 2023-01-09 NOTE — ED STATDOCS - OBJECTIVE STATEMENT
53 y/o male with a PMHx of DM presents to the ED c/o back pain x1 week, worsening over the last 2 days. No trauma or recent heavy lifting. Denies urinary symptoms. Pt took Motrin and Tylenol at home. NKDA. No other complaints at this time. PCP: Dr. Warren.

## 2023-01-09 NOTE — ED ADULT TRIAGE NOTE - CHIEF COMPLAINT QUOTE
Pt presents to the ED c/o back pain x4 days. Pt using a crutch to ambulate due to pain. Pt has been taking Tylenol and Motrin without relief. Denies injury.

## 2023-01-09 NOTE — ED STATDOCS - CLINICAL SUMMARY MEDICAL DECISION MAKING FREE TEXT BOX
53 y/o male with increasing back pain. No bowel or bladder complaints. Will treat with antiinflammatories, steroids, muscle relaxers and follow up.

## 2023-01-09 NOTE — ED STATDOCS - PATIENT PORTAL LINK FT
You can access the FollowMyHealth Patient Portal offered by [facility] by registering at the following website: [email or web address]. By joining [employer]’s FollowMyHealth portal, you will also be able to view your health information using other applications (apps) compatible with our system.

## 2023-06-05 NOTE — PATIENT PROFILE ADULT - FUNCTIONAL SCREEN CURRENT LEVEL: SWALLOWING (IF SCORE 2 OR MORE FOR ANY ITEM, CONSULT REHAB SERVICES), MLM)
[Nutrition/ Exercise/ Weight Management] : nutrition, exercise, weight management [Contraception/ Emergency Contraception/ Safe Sexual Practices] : contraception, emergency contraception, safe sexual practices 0 = swallows foods/liquids without difficulty

## 2023-06-06 NOTE — ED STATDOCS - NSFOLLOWUPINSTRUCTIONS_ED_ALL_ED_FT
"retic  SUBJECTIVE:   Juan is a 68 year old who presents for Preventive Visit.      6/6/2023     7:07 AM   Additional Questions   Roomed by jeanen acevedo     Are you in the first 12 months of your Medicare coverage?  No    Healthy Habits:     In general, how would you rate your overall health?  Excellent    Frequency of exercise:  6-7 days/week    Duration of exercise:  45-60 minutes    Do you usually eat at least 4 servings of fruit and vegetables a day, include whole grains    & fiber and avoid regularly eating high fat or \"junk\" foods?  Yes    Taking medications regularly:  Yes    Medication side effects:  None    Ability to successfully perform activities of daily living:  No assistance needed    Home Safety:  No safety concerns identified    Hearing Impairment:  No hearing concerns    In the past 6 months, have you been bothered by leaking of urine?  No    In general, how would you rate your overall mental or emotional health?  Excellent      PHQ-2 Total Score: 0    Additional concerns today:  No    Exercises daily plus does balance work!  Trying to lose some weight.  He's in his boat daily. His wife gardens all day. They do some things together.  In the winter - walks 3 miles daily - missed 5 days this past winter. Only avoids ice.  Had a pee problem during the night - because he was drinking water before bed. Since stopping that, he admits he probably does not drink enough water.    Have you ever done Advance Care Planning? (For example, a Health Directive, POLST, or a discussion with a medical provider or your loved ones about your wishes): Yes, advance care planning is on file.       Fall risk  Fallen 2 or more times in the past year?: No  Any fall with injury in the past year?: No    Cognitive Screening   1) Repeat 3 items (Leader, Season, Table)    2) Clock draw: NORMAL  3) 3 item recall: Recalls 2 objects   Results: NORMAL clock, 1-2 items recalled: COGNITIVE IMPAIRMENT LESS LIKELY    Mini-CogTM Copyright S " [employer]. Licensed by the author for use in [facility]; reprinted with permission (jackson@[facility]). All rights reserved.      Do you have sleep apnea, excessive snoring or daytime drowsiness?: no    Reviewed and updated as needed this visit by clinical staff   Tobacco  Allergies  Meds              Reviewed and updated as needed this visit by Provider                 Social History     Tobacco Use     Smoking status: Former     Packs/day: 0.50     Types: Cigarettes     Quit date: 2015     Years since quittin.3     Passive exposure: Never     Smokeless tobacco: Never     Tobacco comments:     no passive exposure   Vaping Use     Vaping status: Never Used     Passive vaping exposure: Yes   Substance Use Topics     Alcohol use: No     Comment: Alcoholic Drinks/day: quit drinkning 2023     6:58 AM   Alcohol Use   Prescreen: >3 drinks/day or >7 drinks/week? Not Applicable     Do you have a current opioid prescription? No  Do you use any other controlled substances or medications that are not prescribed by a provider? None    Current providers sharing in care for this patient include:   Patient Care Team:  Martine Ng MD as PCP - General (Family Medicine)  Martine Ng MD as Assigned PCP    The following health maintenance items are reviewed in Epic and correct as of today:  Health Maintenance   Topic Date Due     MEDICARE ANNUAL WELLNESS VISIT  2022     LIPID  2023     A1C  2023     EYE EXAM  08/15/2023     LUNG CANCER SCREENING  2023     BMP  2023     MICROALBUMIN  2023     DIABETIC FOOT EXAM  2023     ANNUAL REVIEW OF HM ORDERS  2023     FALL RISK ASSESSMENT  2024     DTAP/TDAP/TD IMMUNIZATION (3 - Td or Tdap) 06/15/2026     ADVANCE CARE PLANNING  2027     COLORECTAL CANCER SCREENING  2033     HEPATITIS C SCREENING  Completed     PHQ-2 (once per calendar year)  Completed     INFLUENZA VACCINE   Completed     Pneumococcal Vaccine: 65+ Years  Completed     ZOSTER IMMUNIZATION  Completed     AORTIC ANEURYSM SCREENING (SYSTEM ASSIGNED)  Completed     COVID-19 Vaccine  Completed     IPV IMMUNIZATION  Aged Out     MENINGITIS IMMUNIZATION  Aged Out     Lab work is in process  Labs reviewed in EPIC  BP Readings from Last 3 Encounters:   06/06/23 129/70   12/27/22 122/74   08/16/22 128/64    Wt Readings from Last 3 Encounters:   06/06/23 87.1 kg (192 lb)   12/27/22 91.9 kg (202 lb 8 oz)   08/16/22 95.3 kg (210 lb)           Patient Active Problem List   Diagnosis     Benign prostatic hyperplasia     History of pulmonary embolism     Acrochordon     Anxiety     Alcohol abuse     Type 2 diabetes mellitus with complication, without long-term current use of insulin (H)     Hyperosmolarity due to secondary diabetes mellitus (H)     SINDI (acute kidney injury) (H)     Hyponatremia     Cellulitis of right leg     Type 2 diabetes mellitus treated without insulin (H)     Dyslipidemia     Benign essential HTN     Bunion, left     Heart murmur     Onychomycosis     H/O small bowel obstruction     Past Surgical History:   Procedure Laterality Date     ABDOMEN SURGERY      Colen resection     HERNIORRHAPHY VENTRAL N/A 11/3/2017    Procedure: REPAIR, HERNIA, VENTRAL, OPEN;  Surgeon: Maxine Rivera MD;  Location: [facility] [place];  Service:      [facility] APPENDECTOMY      Description: Appendectomy;  Recorded: 07/30/2008;     [facility] EXPLORATORY OF ABDOMEN N/A 11/3/2017    Procedure: LAPAROTOMY, LYSIS OF ADHESIONS, MESH EXPLANTATION VENTRAL HERNIA REPAIR WITH MESH ;  Surgeon: Maxine Rivera MD;  Location: [facility] [place];  Service: General     [facility] REPAIR INCISIONAL HERNIA,REDUCIBLE      Description: Ventral Hernia Repair;  Recorded: 07/30/2008;     [facility] REPAIR UMBILICAL ERAN,<4Y/O,REDUC      Description: Umbilical Hernia Repair;  Recorded: 07/30/2008;       Social History     Tobacco Use     Smoking status: Former     Packs/day: 0.50      Types: Cigarettes     Quit date: 2015     Years since quittin.3     Passive exposure: Never     Smokeless tobacco: Never     Tobacco comments:     no passive exposure   Vaping Use     Vaping status: Never Used     Passive vaping exposure: Yes   Substance Use Topics     Alcohol use: No     Comment: Alcoholic Drinks/day: quit drinkning      Family History   Problem Relation Age of Onset     Mental Illness Mother         depression and drugs/alcohol     Cerebrovascular Disease Father 94.00        had a PE     CABG Father      Pulmonary Embolism Father      Osteoarthritis Father         hip replaced     Other - See Comments Maternal Aunt         cancer was found, then committed suicide     Dementia Maternal Aunt          Current Outpatient Medications   Medication Sig Dispense Refill     aspirin (ASA) 81 MG EC tablet Take 1 tablet (81 mg) by mouth daily 90 tablet 4     atorvastatin (LIPITOR) 10 MG tablet Take 1 tablet (10 mg total) by mouth daily. 90 tablet 4     blood glucose (CONTOUR NEXT TEST) test strip Use 1 each to test blood glucose  As Directed Daily at 8:00 am 200 strip 3     lisinopril (ZESTRIL) 10 MG tablet Take 1 tablet (10 mg) by mouth daily 90 tablet 3     Microlet Lancets MISC Use 1 each to test blood glucose As Directed Daily at 8:00 am 100 each 11     No Known Allergies  Recent Labs   Lab Test 23  0755 22  0652 22  0718 21  0826 21  0707 20  0655 20  1315 19  0808 19  1355 10/02/18  0840   A1C 5.6 6.0* 5.9* 5.8*   < > 6.0*   < > 6.2*   < > 5.9   LDL  --   --  53 65  --  75  --   --    < > 74   HDL  --   --  60 59  --  59  --   --    < > 51   TRIG  --   --  36 43  --  45  --   --    < > 53   ALT  --   --   --  13  --  15  --   --   --  16   CR  --  1.31*  --  1.18  --  1.16  --  1.11  --  1.06   GFRESTIMATED  --  59*  --  68  --  >60  --  >60  --  >60   GFRESTBLACK  --   --   --   --   --  >60  --  >60  --  >60   POTASSIUM  --  4.6   "--  4.3  --  4.8  --  5.0  --  5.1*    < > = values in this interval not displayed.       Review of Systems   Constitutional: Negative for chills and fever.   HENT: Negative for congestion, ear pain, hearing loss and sore throat.    Eyes: Negative for pain and visual disturbance.   Respiratory: Negative for cough and shortness of breath.    Cardiovascular: Negative for chest pain, palpitations and peripheral edema.   Gastrointestinal: Negative for abdominal pain, constipation, diarrhea, heartburn, hematochezia and nausea.   Genitourinary: Negative for dysuria, frequency, genital sores, hematuria, impotence and urgency.   Musculoskeletal: Negative for arthralgias, joint swelling and myalgias.   Skin: Negative for rash.   Neurological: Negative for dizziness, weakness, headaches and paresthesias.   Psychiatric/Behavioral: Negative for mood changes. The patient is not nervous/anxious.    calluses on feet - wants them shaved      OBJECTIVE:   /70   Pulse 54   Temp 98  F (36.7  C)   Resp 14   Ht 1.76 m (5' 9.29\")   Wt 87.1 kg (192 lb)   SpO2 99%   BMI 28.12 kg/m   Estimated body mass index is 28.12 kg/m  as calculated from the following:    Height as of this encounter: 1.76 m (5' 9.29\").    Weight as of this encounter: 87.1 kg (192 lb).  Physical Exam  GENERAL: healthy, alert and no distress  NECK: no adenopathy, no asymmetry, masses, or scars and thyroid normal to palpation  RESP: lungs clear to auscultation - no rales, rhonchi or wheezes  CV: regular rate and rhythm, normal S1 S2, no S3 or S4, no murmur, click or rub, no peripheral edema and peripheral pulses strong  ABDOMEN: soft, nontender, no hepatosplenomegaly, no masses and bowel sounds normal  MS: no gross musculoskeletal defects noted, no edema  SKIN: no suspicious lesions or rashes and caluses on feet - 3 large caluses on right foot, one on left  Diabetic foot exam: normal DP and PT pulses, no trophic changes or ulcerative lesions, normal " monofilament exam, except for findings noted on diabetic foot graphical image.          , reduced sensation at balls of feet and toes    Diagnostic Test Results:  Labs reviewed in Epic  Results for orders placed or performed in visit on 06/06/23 (from the past 24 hour(s))   CBC with platelets   Result Value Ref Range    WBC Count 5.3 4.0 - 11.0 10e3/uL    RBC Count 4.52 4.40 - 5.90 10e6/uL    Hemoglobin 12.2 (L) 13.3 - 17.7 g/dL    Hematocrit 38.3 (L) 40.0 - 53.0 %    MCV 85 78 - 100 fL    MCH 27.0 26.5 - 33.0 pg    MCHC 31.9 31.5 - 36.5 g/dL    RDW 14.4 10.0 - 15.0 %    Platelet Count 199 150 - 450 10e3/uL   Hemoglobin A1c   Result Value Ref Range    Hemoglobin A1C 5.6 0.0 - 5.6 %   UA Macroscopic with reflex to Microscopic and Culture    Specimen: Urine, NOS   Result Value Ref Range    Color Urine Yellow Colorless, Straw, Light Yellow, Yellow    Appearance Urine Clear Clear    Glucose Urine Negative Negative mg/dL    Bilirubin Urine Negative Negative    Ketones Urine Trace (A) Negative mg/dL    Specific Gravity Urine 1.015 1.005 - 1.030    Blood Urine Trace (A) Negative    pH Urine 5.5 5.0 - 7.0    Protein Albumin Urine Negative Negative mg/dL    Urobilinogen Urine 0.2 0.2, 1.0 E.U./dL    Nitrite Urine Negative Negative    Leukocyte Esterase Urine Negative Negative   UA Microscopic with Reflex to Culture   Result Value Ref Range    Bacteria Urine Few (A) None Seen /HPF    RBC Urine 0-2 0-2 /HPF /HPF    WBC Urine 0-5 0-5 /HPF /HPF    Squamous Epithelials Urine None Seen None Seen /LPF    Mucus Urine Present (A) None Seen /LPF    Hyaline Casts Urine 2-5 (A) None Seen /LPF    Narrative    Urine Culture not indicated       ASSESSMENT / PLAN:   (Z00.00) Encounter for annual wellness visit (AWV) in Medicare patient  (primary encounter diagnosis)  Comment: he is very much on top of his health at this time, wanting to improve even more. However his history of alcohol abuse and smoking will factor in.    (Z76.0) Encounter  for medication refill  Comment: refills given  Plan: Microlet Lancets MISC, blood glucose (CONTOUR         NEXT TEST) test strip, atorvastatin (LIPITOR)         10 MG tablet, lisinopril (ZESTRIL) 10 MG tablet    (I10) Benign essential HTN  Comment: well controlled. Continue same meds  Plan: aspirin (ASA) 81 MG EC tablet, lisinopril         (ZESTRIL) 10 MG tablet, UA Microscopic with         Reflex to Culture    (E11.9) Type 2 diabetes mellitus treated without insulin (H)  Comment: A1-C is now 5.6! Amazing. Continue same - stay active  Plan: UA Microscopic with Reflex to Culture    (N18.31) Stage 3a chronic kidney disease (H)  Comment: watch this closely - improve water drinking daily    (L84) Callus of foot  Comment: shaved 4 calluses, tolerated well    Other anemia - he has persistent anemia but normal MCV and ferritin. Retic count is slightly low. Will check thyroid, liver, B12. If nothing is obvious, will refer to hematology.      COUNSELING:  Reviewed preventive health counseling, as reflected in patient instructions       Regular exercise       Healthy diet/nutrition       Dental care       Bladder control       Fall risk prevention       Immunizations    Vaccinated for: Pneumococcal and Declined: Pneumococcal due to Other since he just had pneumo 23 in 2022           Aspirin prophylaxis         He reports that he quit smoking about 8 years ago. His smoking use included cigarettes. He smoked an average of .5 packs per day. He has never been exposed to tobacco smoke. He has never used smokeless tobacco.      Appropriate preventive services were discussed with this patient, including applicable screening as appropriate for cardiovascular disease, diabetes, osteopenia/osteoporosis, and glaucoma.  As appropriate for age/gender, discussed screening for colorectal cancer, prostate cancer, breast cancer, and cervical cancer. Checklist reviewing preventive services available has been given to the patient.    Reviewed  patients plan of care and provided an AVS. The Basic Care Plan (routine screening as documented in Health Maintenance) for Juan meets the Care Plan requirement. This Care Plan has been established and reviewed with the Patient.      Martine Ng MD  [facility]    Identified Health Risks:    I have reviewed Opioid Use Disorder and Substance Use Disorder risk factors and made any needed referrals.      rest   warm moist heat to the area  flexeril every 8 hours  ibuprofen every 6 hours  follow up with your doctor      Lumbosacral Strain      Lumbosacral strain is an injury that causes pain in the lower back (lumbosacral spine). This injury usually happens from overstretching the muscles or ligaments along your spine. Ligaments are cord-like tissues that connect bones to other bones. A strain can affect one or more muscles or ligaments.      What are the causes?    This condition may be caused by:  •A hard, direct hit to the back.    •Overstretching the lower back muscles. This may result from:  •A fall.      •Lifting something heavy.      •Repetitive movements such as bending or crouching.          What increases the risk?    The following factors may make you more likely to develop this condition:•Participating in sports or activities that involve:  •A sudden twist of the back.      •Pushing or pulling motions.        •Being overweight or obese.      •Having poor strength and flexibility, especially tight hamstrings or weak muscles in the back or abdomen.      •Having too much of a curve in the lower back.      •Having a pelvis that is tilted forward.        What are the signs or symptoms?    The main symptom of this condition is pain in the lower back, at the site of the strain. Pain may also be felt down one or both legs.      How is this diagnosed?    This condition is diagnosed based on your symptoms, your medical history, and a physical exam. During the physical exam, your health care provider may push on certain areas of your back to find the source of your pain.    You may be asked to bend forward, backward, and side to side to check your pain and range of motion. You may also have imaging tests, such as X-rays and an MRI.      How is this treated?    This condition may be treated by:  •Applying heat and cold on the affected area.      •Taking medicines to help relieve pain and relax your muscles.      •Taking NSAIDs, such as ibuprofen, to help reduce swelling and discomfort.      •Doing stretching and strengthening exercises for your lower back.      Symptoms usually improve within several weeks of treatment. However, recovery time varies. When your symptoms improve, gradually return to your normal routine as soon as possible to reduce pain, avoid stiffness, and keep muscle strength.      Follow these instructions at home:    Medicines     •Take over-the-counter and prescription medicines only as told by your health care provider.    •Ask your health care provider if the medicine prescribed to you:  •Requires you to avoid driving or using heavy machinery.    •Can cause constipation. You may need to take these actions to prevent or treat constipation:  •Drink enough fluid to keep your urine pale yellow.      •Take over-the-counter or prescription medicines.      •Eat foods that are high in fiber, such as beans, whole grains, and fresh fruits and vegetables.      •Limit foods that are high in fat and processed sugars, such as fried or sweet foods.            Managing pain, stiffness, and swelling                 •If directed, put ice on the injured area. To do this:  •Put ice in a plastic bag.      •Place a towel between your skin and the bag.      •Leave the ice on for 20 minutes, 2–3 times a day.      •If directed, apply heat on the affected area as often as told by your health care provider. Use the heat source that your health care provider recommends, such as a moist heat pack or a heating pad.  •Place a towel between your skin and the heat source.      •Leave the heat on for 20–30 minutes.      •Remove the heat if your skin turns bright red. This is especially important if you are unable to feel pain, heat, or cold. You may have a greater risk of getting burned.        Activity     •Rest as told by your health care provider.      • Do not stay in bed. Staying in bed for more than 1–2 days can delay your recovery.      •Return to your normal activities as told by your health care provider. Ask your health care provider what activities are safe for you.      •Avoid activities that take a lot of energy for as long as told by your health care provider.      •Do exercises as told by your health care provider. This includes stretching and strengthening exercises.      General instructions     •Sit up and stand up straight. Avoid leaning forward when you sit, or hunching over when you stand.      • Do not use any products that contain nicotine or tobacco, such as cigarettes, e-cigarettes, and chewing tobacco. If you need help quitting, ask your health care provider.      •Keep all follow-up visits as told by your health care provider. This is important.        How is this prevented?     •Use correct form when playing sports and lifting heavy objects.      •Use good posture when sitting and standing.      •Maintain a healthy weight.      •Sleep on a mattress with medium firmness to support your back.      •Do at least 150 minutes of moderate-intensity exercise each week, such as brisk walking or water aerobics. Try a form of exercise that takes stress off your back, such as swimming or stationary cycling.    •Maintain physical fitness, including:  •Strength.      •Flexibility.          Contact a health care provider if:    •Your back pain does not improve after several weeks of treatment.      •Your symptoms get worse.        Get help right away if:    •Your back pain is severe.      •You cannot stand or walk.      •You have difficulty controlling when you urinate or when you have a bowel movement.      •You feel nauseous or you vomit.      •Your feet or legs get very cold, turn pale, or look blue.      •You have numbness, tingling, weakness, or problems using your arms or legs.    •You develop any of the following:  •Shortness of breath.      •Dizziness.      •Pain in your legs.      •Weakness in your buttocks or legs.          Summary    •Lumbosacral strain is an injury that causes pain in the lower back (lumbosacral spine).      •This injury usually happens from overstretching the muscles or ligaments along your spine.      •This condition may be caused by a direct hit to the lower back or by overstretching the lower back muscles.      •Symptoms usually improve within several weeks of treatment.      This information is not intended to replace advice given to you by your health care provider. Make sure you discuss any questions you have with your health care provider.      Document Revised: 05/12/2020 Document Reviewed: 05/12/2020    [employer] Patient Education © 2022 [employer] rest   warm moist heat to the area  flexeril every 8 hours  ibuprofen every 6 hours  lidocaine patch  follow up with your doctor      Lumbosacral Strain      Lumbosacral strain is an injury that causes pain in the lower back (lumbosacral spine). This injury usually happens from overstretching the muscles or ligaments along your spine. Ligaments are cord-like tissues that connect bones to other bones. A strain can affect one or more muscles or ligaments.      What are the causes?    This condition may be caused by:  •A hard, direct hit to the back.    •Overstretching the lower back muscles. This may result from:  •A fall.      •Lifting something heavy.      •Repetitive movements such as bending or crouching.          What increases the risk?    The following factors may make you more likely to develop this condition:•Participating in sports or activities that involve:  •A sudden twist of the back.      •Pushing or pulling motions.        •Being overweight or obese.      •Having poor strength and flexibility, especially tight hamstrings or weak muscles in the back or abdomen.      •Having too much of a curve in the lower back.      •Having a pelvis that is tilted forward.        What are the signs or symptoms?    The main symptom of this condition is pain in the lower back, at the site of the strain. Pain may also be felt down one or both legs.      How is this diagnosed?    This condition is diagnosed based on your symptoms, your medical history, and a physical exam. During the physical exam, your health care provider may push on certain areas of your back to find the source of your pain.    You may be asked to bend forward, backward, and side to side to check your pain and range of motion. You may also have imaging tests, such as X-rays and an MRI.      How is this treated?    This condition may be treated by:  •Applying heat and cold on the affected area.      •Taking medicines to help relieve pain and relax your muscles.      •Taking NSAIDs, such as ibuprofen, to help reduce swelling and discomfort.      •Doing stretching and strengthening exercises for your lower back.      Symptoms usually improve within several weeks of treatment. However, recovery time varies. When your symptoms improve, gradually return to your normal routine as soon as possible to reduce pain, avoid stiffness, and keep muscle strength.      Follow these instructions at home:    Medicines     •Take over-the-counter and prescription medicines only as told by your health care provider.    •Ask your health care provider if the medicine prescribed to you:  •Requires you to avoid driving or using heavy machinery.    •Can cause constipation. You may need to take these actions to prevent or treat constipation:  •Drink enough fluid to keep your urine pale yellow.      •Take over-the-counter or prescription medicines.      •Eat foods that are high in fiber, such as beans, whole grains, and fresh fruits and vegetables.      •Limit foods that are high in fat and processed sugars, such as fried or sweet foods.            Managing pain, stiffness, and swelling                 •If directed, put ice on the injured area. To do this:  •Put ice in a plastic bag.      •Place a towel between your skin and the bag.      •Leave the ice on for 20 minutes, 2–3 times a day.      •If directed, apply heat on the affected area as often as told by your health care provider. Use the heat source that your health care provider recommends, such as a moist heat pack or a heating pad.  •Place a towel between your skin and the heat source.      •Leave the heat on for 20–30 minutes.      •Remove the heat if your skin turns bright red. This is especially important if you are unable to feel pain, heat, or cold. You may have a greater risk of getting burned.        Activity     •Rest as told by your health care provider.      • Do not stay in bed. Staying in bed for more than 1–2 days can delay your recovery.      •Return to your normal activities as told by your health care provider. Ask your health care provider what activities are safe for you.      •Avoid activities that take a lot of energy for as long as told by your health care provider.      •Do exercises as told by your health care provider. This includes stretching and strengthening exercises.      General instructions     •Sit up and stand up straight. Avoid leaning forward when you sit, or hunching over when you stand.      • Do not use any products that contain nicotine or tobacco, such as cigarettes, e-cigarettes, and chewing tobacco. If you need help quitting, ask your health care provider.      •Keep all follow-up visits as told by your health care provider. This is important.        How is this prevented?     •Use correct form when playing sports and lifting heavy objects.      •Use good posture when sitting and standing.      •Maintain a healthy weight.      •Sleep on a mattress with medium firmness to support your back.      •Do at least 150 minutes of moderate-intensity exercise each week, such as brisk walking or water aerobics. Try a form of exercise that takes stress off your back, such as swimming or stationary cycling.    •Maintain physical fitness, including:  •Strength.      •Flexibility.          Contact a health care provider if:    •Your back pain does not improve after several weeks of treatment.      •Your symptoms get worse.        Get help right away if:    •Your back pain is severe.      •You cannot stand or walk.      •You have difficulty controlling when you urinate or when you have a bowel movement.      •You feel nauseous or you vomit.      •Your feet or legs get very cold, turn pale, or look blue.      •You have numbness, tingling, weakness, or problems using your arms or legs.    •You develop any of the following:  •Shortness of breath.      •Dizziness.      •Pain in your legs.      •Weakness in your buttocks or legs.          Summary    •Lumbosacral strain is an injury that causes pain in the lower back (lumbosacral spine).      •This injury usually happens from overstretching the muscles or ligaments along your spine.      •This condition may be caused by a direct hit to the lower back or by overstretching the lower back muscles.      •Symptoms usually improve within several weeks of treatment.      This information is not intended to replace advice given to you by your health care provider. Make sure you discuss any questions you have with your health care provider.      Document Revised: 05/12/2020 Document Reviewed: 05/12/2020    Elsevier Patient Education © 2022 [employer]

## 2023-08-18 PROBLEM — E11.9 TYPE 2 DIABETES MELLITUS WITHOUT COMPLICATIONS: Chronic | Status: ACTIVE | Noted: 2023-01-09

## 2023-09-11 ENCOUNTER — APPOINTMENT (OUTPATIENT)
Dept: CARDIOLOGY | Facility: CLINIC | Age: 53
End: 2023-09-11

## 2024-09-18 ENCOUNTER — EMERGENCY (EMERGENCY)
Facility: HOSPITAL | Age: 54
LOS: 0 days | Discharge: HOME | End: 2024-09-18
Attending: EMERGENCY MEDICINE
Payer: MEDICAID

## 2024-09-18 VITALS
SYSTOLIC BLOOD PRESSURE: 129 MMHG | WEIGHT: 182.54 LBS | TEMPERATURE: 98 F | DIASTOLIC BLOOD PRESSURE: 90 MMHG | RESPIRATION RATE: 18 BRPM | HEART RATE: 67 BPM | OXYGEN SATURATION: 97 %

## 2024-09-18 DIAGNOSIS — M79.89 OTHER SPECIFIED SOFT TISSUE DISORDERS: ICD-10-CM

## 2024-09-18 DIAGNOSIS — M25.512 PAIN IN LEFT SHOULDER: ICD-10-CM

## 2024-09-18 DIAGNOSIS — Z91.010 ALLERGY TO PEANUTS: ICD-10-CM

## 2024-09-18 DIAGNOSIS — J39.2 OTHER DISEASES OF PHARYNX: ICD-10-CM

## 2024-09-18 DIAGNOSIS — M25.511 PAIN IN RIGHT SHOULDER: ICD-10-CM

## 2024-09-18 PROCEDURE — 99282 EMERGENCY DEPT VISIT SF MDM: CPT

## 2024-09-18 PROCEDURE — 99283 EMERGENCY DEPT VISIT LOW MDM: CPT

## 2024-09-18 NOTE — ED STATDOCS - OBJECTIVE STATEMENT
54-year-old male with multiple complaints.  Patient went to a surgery center in [place] today to have his right rotator cuff repaired.  Patient states he got home from the surgery and his throat was bothering him so he came to the hospital.  Patient ALSO wants to have his sling adjusted.    Upon arriving at the hospital patient spoke to the operating physician who states that patient was intubated for the procedure and is likely the cause of his throat irritation patient is tolerating p.o. and secretions

## 2024-09-18 NOTE — ED STATDOCS - NSFOLLOWUPINSTRUCTIONS_ED_ALL_ED_FT
Hope with your orthopedist tomorrow.  Keep sling in place.  Return to ER for any new or worsening symptoms

## 2024-09-18 NOTE — ED STATDOCS - PHYSICAL EXAMINATION
Awake and alert no acute distress tolerating secretions normal voice  No neck swelling  Right upper extremity is in a sling mild hand swelling no erythema good pulses

## 2024-09-18 NOTE — ED STATDOCS - CLINICAL SUMMARY MEDICAL DECISION MAKING FREE TEXT BOX
Patient status post surgery today arm is in sling encouraged to follow-up with his orthopedist tomorrow

## 2024-09-18 NOTE — ED ADULT TRIAGE NOTE - CHIEF COMPLAINT QUOTE
Pt presents to ED for cc of hand swelling to right hand, difficulty swallowing in left side of troat, Pt was seen and tx at [facility] for recent surgical procedure to rotator cuff. +sling to right arm, reports "my arm does not feel right I need to be seen." Sp02 95%RA HR 77bpm Pt presents to ED for cc of hand swelling to right hand, difficulty swallowing in left side of troat, Pt was seen and tx at [facility] for recent surgical procedure to rotator cuff. +sling to right arm, reports "my arm does not feel right I need to be seen." Sp02 95%RA HR 77bpm NAD, pt able to form sentences without any difficulty. NAD

## 2024-09-18 NOTE — ED ADULT NURSE NOTE - CHIEF COMPLAINT QUOTE
Pt presents to ED for cc of hand swelling to right hand, difficulty swallowing in left side of troat, Pt was seen and tx at [facility] for recent surgical procedure to rotator cuff. +sling to right arm, reports "my arm does not feel right I need to be seen." Sp02 95%RA HR 77bpm NAD, pt able to form sentences without any difficulty. NAD

## 2024-09-18 NOTE — ED ADULT NURSE NOTE - OBJECTIVE STATEMENT
pt seen and evaluated by MD determined safe for discharge. denies worsening symptoms. pt A&Ox4, well appearing, and ambulatory at baseline with no further complaints or discomforts reported at this time. will follow up tomorrow with orthopedist

## 2024-09-18 NOTE — ED STATDOCS - ATTENDING APP SHARED VISIT CONTRIBUTION OF CARE
I,Guillermo Tai MD,  performed the initial face to face bedside interview with this patient regarding history of present illness, review of symptoms and relevant past medical, social and family history.  I completed an independent physical examination.  I was the initial provider who evaluated this patient. I have signed out the follow up of any pending tests (i.e. labs, radiological studies) to the ACP.  I have communicated the patient’s plan of care and disposition with the ACP.

## 2024-09-18 NOTE — ED STATDOCS - CARE PLAN
Principal Discharge DX:	Throat irritation  Secondary Diagnosis:	Shoulder pain, left  Secondary Diagnosis:	Pain in right shoulder   1

## 2024-09-18 NOTE — ED STATDOCS - PATIENT PORTAL LINK FT
You can access the FollowMyHealth Patient Portal offered by [facility] by registering at the following website: [email or web address]. By joining [employer]’s FollowMyHealth portal, you will also be able to view your health information using other applications (apps) compatible with our system.

## 2025-07-20 NOTE — ED STATDOCS - NORMAL, MLM
ephraim all pertinent systems normal You can access the FollowMyHealth Patient Portal offered by [facility] by registering at the following website: [email or web address]. By joining [employer]’s FollowMyHealth portal, you will also be able to view your health information using other applications (apps) compatible with our system.

## 2025-09-11 ENCOUNTER — NON-APPOINTMENT (OUTPATIENT)
Age: 55
End: 2025-09-11